# Patient Record
Sex: FEMALE | Race: OTHER | URBAN - METROPOLITAN AREA
[De-identification: names, ages, dates, MRNs, and addresses within clinical notes are randomized per-mention and may not be internally consistent; named-entity substitution may affect disease eponyms.]

---

## 2017-11-16 ENCOUNTER — TELEPHONE (OUTPATIENT)
Dept: SURGERY | Facility: CLINIC | Age: 47
End: 2017-11-16

## 2017-11-20 ENCOUNTER — TELEPHONE (OUTPATIENT)
Dept: SURGERY | Facility: CLINIC | Age: 47
End: 2017-11-20

## 2017-11-20 NOTE — TELEPHONE ENCOUNTER
Patient returning call; she reports that she is seeking a prophylactic mastectomy and abdominal flap reconstruction. She has a history of ovarian cancer for which she has had a hysterectomy and is BRCA1 positive gene carrier.      I asked the pt if she has

## 2017-12-01 ENCOUNTER — OFFICE VISIT (OUTPATIENT)
Dept: SURGERY | Facility: CLINIC | Age: 47
End: 2017-12-01

## 2017-12-01 VITALS
WEIGHT: 158.38 LBS | HEIGHT: 60 IN | BODY MASS INDEX: 31.09 KG/M2 | SYSTOLIC BLOOD PRESSURE: 121 MMHG | HEART RATE: 70 BPM | DIASTOLIC BLOOD PRESSURE: 81 MMHG

## 2017-12-01 DIAGNOSIS — Z01.818 PRE-OP TESTING: Primary | ICD-10-CM

## 2017-12-01 DIAGNOSIS — Z91.89 INCREASED RISK OF BREAST CANCER: ICD-10-CM

## 2017-12-01 PROCEDURE — 99203 OFFICE O/P NEW LOW 30 MIN: CPT | Performed by: SURGERY

## 2017-12-01 RX ORDER — TAMOXIFEN CITRATE 10 MG/1
20 TABLET ORAL DAILY
COMMUNITY
End: 2018-03-16 | Stop reason: ALTCHOICE

## 2017-12-01 NOTE — PROGRESS NOTES
Surgery and wash instructions verbally reviewed with the patient and she was provided with written instructions. An order for CTA abdomen/pelvis placed and discussed with the patient.  She was also provided with Dr. Shwetha Perez information and understands that

## 2017-12-01 NOTE — CONSULTS
New Patient Consultation    Chief Complaint: Bailey Ray 1 mutation    History of Present Illness:   Ophelia Chaparro is a 50year old female referred by her friend a former pt of mine for [address] free flap reconstruction.    The patient is positive and is planning to patient denies ear pain, ear drainage, hearing loss, change in vision, double vision, cataracts, glaucoma, nasal congestion, nosebleed, hoarseness, sore throat, or swollen glands  Respiratory:   The patient denies shortness of breath, cough, bloody cough, feeling of despair. Physical Exam:    /81 (BP Location: Right arm, Patient Position: Sitting)   Pulse 70   Ht 1.524 m (5')   Wt 71.8 kg (158 lb 6.4 oz)   BMI 30.94 kg/m²     Constitutional: The patient is an alert, oriented and well-developed. discussed the possible need for revisions, the process of nipple areolar reconstruction, and a contralateral balancing procedure (augmentation, mastopexy, reduction).      She also would like us to recommend a breast surgeon and we gave her Dr Shaheed Obrien infor

## 2017-12-13 ENCOUNTER — OFFICE VISIT (OUTPATIENT)
Dept: SURGERY | Facility: CLINIC | Age: 47
End: 2017-12-13

## 2017-12-13 DIAGNOSIS — Z15.01 BRCA1 GENE MUTATION POSITIVE: Primary | ICD-10-CM

## 2017-12-13 DIAGNOSIS — Z15.09 BRCA1 GENE MUTATION POSITIVE: Primary | ICD-10-CM

## 2017-12-13 PROCEDURE — 99245 OFF/OP CONSLTJ NEW/EST HI 55: CPT | Performed by: SURGERY

## 2017-12-13 NOTE — PROGRESS NOTES
Reviewed planned surgery portion for Dr Tati Olivo. Pre op previously done by plastics. Pt to call with any questions.

## 2017-12-14 NOTE — PROGRESS NOTES
Breast Surgery New Patient Consultation    This is the first visit for this 50year old woman, referred by Dr. Chichi Barrera, who presents for evaluation of BRCA1 mutation.     History of Present Illness:   Ms. Emily Wells is a 50year old woman who presents with a daily. Disp:  Rfl:      No current facility-administered medications on file prior to visit.        No outpatient prescriptions have been marked as taking for the 12/13/17 encounter (Office Visit) with Elizabeth Bermudez MD.    Allergies:    No Known Allerg urinary hesitancy or retaining urine, painful urination, urinary incontinence, decreased urine stream, blood in the urine or vaginal/penile discharge.     Skin:    The patient denies rash, itching, skin lesions, dry skin, change in skin color or change in m movement of the pectoralis. There is no nipple retraction. No nipple discharge can be elicited. The parenchyma is mildly nodular.  There are no dominant masses in the breast. The axillary tail is normal.  There is a well-healed lumpectomy incision in the in normal limits, we will proceed with a bilateral skin sparing mastectomy. She will need no gabriel interrogation provided her imaging remains normal with no new clinical symptoms prior to her surgical intervention.   The risks and possible complications of th

## 2017-12-18 ENCOUNTER — TELEPHONE (OUTPATIENT)
Dept: SURGERY | Facility: CLINIC | Age: 47
End: 2017-12-18

## 2017-12-18 NOTE — TELEPHONE ENCOUNTER
I contacted her medical oncologist office regarding a letter, authorizing the removal of patient's mediport at the time of her mastectomies surgery.  I received confirmation they got my request, she is forwarding it to her physician

## 2018-02-01 ENCOUNTER — HOSPITAL ENCOUNTER (OUTPATIENT)
Dept: CT IMAGING | Facility: HOSPITAL | Age: 48
Discharge: HOME | End: 2018-02-01
Attending: SURGERY
Payer: COMMERCIAL

## 2018-02-01 DIAGNOSIS — Z01.818 PRE-OP TESTING: ICD-10-CM

## 2018-02-01 LAB — CREAT BLD-MCNC: 0.6 MG/DL (ref 0.5–1.5)

## 2018-02-01 PROCEDURE — 74174 CTA ABD&PLVS W/CONTRAST: CPT | Performed by: SURGERY

## 2018-02-01 PROCEDURE — 82565 ASSAY OF CREATININE: CPT

## 2018-02-07 ENCOUNTER — TELEPHONE (OUTPATIENT)
Dept: SURGERY | Facility: CLINIC | Age: 48
End: 2018-02-07

## 2018-02-07 NOTE — TELEPHONE ENCOUNTER
I called the pt and discussed with her the CTA results (incisional hernia and long intramuscular course of perforators). She will see Dr Lonnie Souza to assist with hernia repair at time of GOPAL free flap.  Pt also wants to come in for pre-OP visit mid/end of Febr

## 2018-02-20 ENCOUNTER — OFFICE VISIT (OUTPATIENT)
Dept: SURGERY | Facility: CLINIC | Age: 48
End: 2018-02-20

## 2018-02-20 VITALS
WEIGHT: 158 LBS | BODY MASS INDEX: 31.02 KG/M2 | HEIGHT: 60 IN | TEMPERATURE: 97 F | SYSTOLIC BLOOD PRESSURE: 130 MMHG | DIASTOLIC BLOOD PRESSURE: 84 MMHG | HEART RATE: 73 BPM

## 2018-02-20 DIAGNOSIS — K43.2 INCISIONAL HERNIA OF ANTERIOR ABDOMINAL WALL WITHOUT OBSTRUCTION OR GANGRENE: ICD-10-CM

## 2018-02-20 DIAGNOSIS — K43.9 VENTRAL HERNIA WITHOUT OBSTRUCTION OR GANGRENE: Primary | ICD-10-CM

## 2018-02-20 PROCEDURE — 99243 OFF/OP CNSLTJ NEW/EST LOW 30: CPT | Performed by: SURGERY

## 2018-02-21 ENCOUNTER — TELEPHONE (OUTPATIENT)
Dept: SURGERY | Facility: CLINIC | Age: 48
End: 2018-02-21

## 2018-02-21 DIAGNOSIS — K43.9 VENTRAL HERNIA WITHOUT OBSTRUCTION OR GANGRENE: Primary | ICD-10-CM

## 2018-02-23 ENCOUNTER — OFFICE VISIT (OUTPATIENT)
Dept: SURGERY | Facility: CLINIC | Age: 48
End: 2018-02-23

## 2018-02-23 VITALS — WEIGHT: 156.81 LBS | BODY MASS INDEX: 30.79 KG/M2 | HEIGHT: 60 IN

## 2018-02-23 DIAGNOSIS — Z91.89 INCREASED RISK OF BREAST CANCER: Primary | ICD-10-CM

## 2018-02-23 PROCEDURE — 99213 OFFICE O/P EST LOW 20 MIN: CPT | Performed by: SURGERY

## 2018-02-23 NOTE — PROGRESS NOTES
Of note, the patient would like right chest port removal at the time of the joint case with Rodolfo Cole, Lokesh Baker and Reva.    The patient understands that we need documentation from Dr. Percy Alba prior to the surgery to acknowledge there is no contraind

## 2018-02-23 NOTE — CONSULTS
Estabilshed Patient Consultation    Chief Complaint: absence of both breasts    History of Present Illness:   Kisha Quintero is a 50year old female referred by her friend a former pt of mine for [address] free flap reconstruction.    The patient is positive and Exam:    There were no vitals taken for this visit.     Constitutional: The patient is an alert, oriented and well-developed.      Neurologic: Speech patterns and movements are normal.      Psychiatric: Affect is appropriate.     Eyes: Conjunctiva are clear Finally, we discussed the possible need for revisions, the process of nipple areolar reconstruction.     She does not want a nipple sparing approach but rather a skin sparing approach and this is a good choice based on her desire to be smaller and more lif

## 2018-02-26 ENCOUNTER — TELEPHONE (OUTPATIENT)
Dept: SURGERY | Facility: CLINIC | Age: 48
End: 2018-02-26

## 2018-02-26 ENCOUNTER — NURSE ONLY (OUTPATIENT)
Dept: SURGERY | Facility: CLINIC | Age: 48
End: 2018-02-26

## 2018-02-26 NOTE — TELEPHONE ENCOUNTER
I called and spoke with the patient regarding her plan for the week in preparation for surgery with Mehul Nelson and Leigh Duffy on Monday, 3/5-  She plans to get a Neupogen injection with Dr. Forrest Limon office this Wednesday and have labs redrawn th

## 2018-02-26 NOTE — PROGRESS NOTES
An order was received for removal of port-a-cath per Dr. Shane Kothari, the patient's oncologist at the [facility]-  This procedure is to be done at the time of bilateral breast reconstructions with abdominal free flap.    Order sent to be scanned in t

## 2018-02-27 ENCOUNTER — TELEPHONE (OUTPATIENT)
Dept: SURGERY | Facility: CLINIC | Age: 48
End: 2018-02-27

## 2018-03-02 ENCOUNTER — TELEPHONE (OUTPATIENT)
Dept: SURGERY | Facility: CLINIC | Age: 48
End: 2018-03-02

## 2018-03-02 ENCOUNTER — ANESTHESIA EVENT (OUTPATIENT)
Dept: SURGERY | Facility: HOSPITAL | Age: 48
DRG: 585 | End: 2018-03-02
Payer: COMMERCIAL

## 2018-03-02 NOTE — TELEPHONE ENCOUNTER
I spoke to [facility] in Dr. Rebecca Howard office who informed me that Dr. Ziyad Rush will send us a  history and physical form, clearing stating that patient is cleared for surgery. Notified patient that we are still waiting for the form to be sent to our office.

## 2018-03-05 ENCOUNTER — APPOINTMENT (OUTPATIENT)
Dept: GENERAL RADIOLOGY | Facility: HOSPITAL | Age: 48
DRG: 585 | End: 2018-03-05
Attending: SURGERY
Payer: COMMERCIAL

## 2018-03-05 ENCOUNTER — HOSPITAL ENCOUNTER (INPATIENT)
Facility: HOSPITAL | Age: 48
LOS: 4 days | Discharge: HOME | DRG: 585 | End: 2018-03-09
Attending: SURGERY | Admitting: SURGERY
Payer: COMMERCIAL

## 2018-03-05 ENCOUNTER — ANESTHESIA (OUTPATIENT)
Dept: SURGERY | Facility: HOSPITAL | Age: 48
DRG: 585 | End: 2018-03-05
Payer: COMMERCIAL

## 2018-03-05 ENCOUNTER — SURGERY (OUTPATIENT)
Age: 48
End: 2018-03-05

## 2018-03-05 DIAGNOSIS — Z91.89 INCREASED RISK OF BREAST CANCER: ICD-10-CM

## 2018-03-05 PROCEDURE — 88300 SURGICAL PATH GROSS: CPT | Performed by: SURGERY

## 2018-03-05 PROCEDURE — 74018 RADEX ABDOMEN 1 VIEW: CPT | Performed by: SURGERY

## 2018-03-05 PROCEDURE — 0HRT077 REPLACEMENT OF RIGHT BREAST USING DEEP INFERIOR EPIGASTRIC ARTERY PERFORATOR FLAP, OPEN APPROACH: ICD-10-PCS | Performed by: SURGERY

## 2018-03-05 PROCEDURE — 0HRU077 REPLACEMENT OF LEFT BREAST USING DEEP INFERIOR EPIGASTRIC ARTERY PERFORATOR FLAP, OPEN APPROACH: ICD-10-PCS | Performed by: SURGERY

## 2018-03-05 PROCEDURE — 87081 CULTURE SCREEN ONLY: CPT | Performed by: SURGERY

## 2018-03-05 PROCEDURE — 0HTV0ZZ RESECTION OF BILATERAL BREAST, OPEN APPROACH: ICD-10-PCS | Performed by: SURGERY

## 2018-03-05 PROCEDURE — 0JPT0WZ REMOVAL OF TOTALLY IMPLANTABLE VASCULAR ACCESS DEVICE FROM TRUNK SUBCUTANEOUS TISSUE AND FASCIA, OPEN APPROACH: ICD-10-PCS | Performed by: SURGERY

## 2018-03-05 PROCEDURE — 88305 TISSUE EXAM BY PATHOLOGIST: CPT | Performed by: SURGERY

## 2018-03-05 PROCEDURE — 0WUF0JZ SUPPLEMENT ABDOMINAL WALL WITH SYNTHETIC SUBSTITUTE, OPEN APPROACH: ICD-10-PCS | Performed by: SURGERY

## 2018-03-05 PROCEDURE — 88307 TISSUE EXAM BY PATHOLOGIST: CPT | Performed by: SURGERY

## 2018-03-05 DEVICE — IMPLANTABLE DEVICE: Type: IMPLANTABLE DEVICE | Site: BREAST | Status: FUNCTIONAL

## 2018-03-05 DEVICE — VENTRALIGHT ST MESH
Type: IMPLANTABLE DEVICE | Site: ABDOMEN | Status: FUNCTIONAL
Brand: VENTRALIGHT ST

## 2018-03-05 RX ORDER — SODIUM CHLORIDE, SODIUM LACTATE, POTASSIUM CHLORIDE, CALCIUM CHLORIDE 600; 310; 30; 20 MG/100ML; MG/100ML; MG/100ML; MG/100ML
INJECTION, SOLUTION INTRAVENOUS CONTINUOUS
Status: DISCONTINUED | OUTPATIENT
Start: 2018-03-05 | End: 2018-03-09

## 2018-03-05 RX ORDER — MORPHINE SULFATE 4 MG/ML
8 INJECTION, SOLUTION INTRAMUSCULAR; INTRAVENOUS
Status: DISCONTINUED | OUTPATIENT
Start: 2018-03-05 | End: 2018-03-06

## 2018-03-05 RX ORDER — HEPARIN SODIUM 5000 [USP'U]/ML
INJECTION, SOLUTION INTRAVENOUS; SUBCUTANEOUS AS NEEDED
Status: DISCONTINUED | OUTPATIENT
Start: 2018-03-05 | End: 2018-03-05 | Stop reason: HOSPADM

## 2018-03-05 RX ORDER — MORPHINE SULFATE 4 MG/ML
2 INJECTION, SOLUTION INTRAMUSCULAR; INTRAVENOUS EVERY 5 MIN PRN
Status: ACTIVE | OUTPATIENT
Start: 2018-03-05 | End: 2018-03-05

## 2018-03-05 RX ORDER — METOCLOPRAMIDE HYDROCHLORIDE 5 MG/ML
10 INJECTION INTRAMUSCULAR; INTRAVENOUS AS NEEDED
Status: DISCONTINUED | OUTPATIENT
Start: 2018-03-05 | End: 2018-03-06

## 2018-03-05 RX ORDER — CEFAZOLIN SODIUM/WATER 2 G/20 ML
2 SYRINGE (ML) INTRAVENOUS ONCE
Status: DISCONTINUED | OUTPATIENT
Start: 2018-03-05 | End: 2018-03-05 | Stop reason: HOSPADM

## 2018-03-05 RX ORDER — DIPHENHYDRAMINE HYDROCHLORIDE 50 MG/ML
25 INJECTION INTRAMUSCULAR; INTRAVENOUS EVERY 4 HOURS PRN
Status: DISCONTINUED | OUTPATIENT
Start: 2018-03-05 | End: 2018-03-09

## 2018-03-05 RX ORDER — MORPHINE SULFATE 4 MG/ML
2 INJECTION, SOLUTION INTRAMUSCULAR; INTRAVENOUS
Status: DISCONTINUED | OUTPATIENT
Start: 2018-03-05 | End: 2018-03-09

## 2018-03-05 RX ORDER — ENOXAPARIN SODIUM 100 MG/ML
40 INJECTION SUBCUTANEOUS DAILY
Status: DISCONTINUED | OUTPATIENT
Start: 2018-03-06 | End: 2018-03-09

## 2018-03-05 RX ORDER — CEFAZOLIN SODIUM/WATER 2 G/20 ML
2 SYRINGE (ML) INTRAVENOUS EVERY 8 HOURS
Status: COMPLETED | OUTPATIENT
Start: 2018-03-06 | End: 2018-03-06

## 2018-03-05 RX ORDER — ACETAMINOPHEN 325 MG/1
650 TABLET ORAL 4 TIMES DAILY
Status: DISCONTINUED | OUTPATIENT
Start: 2018-03-05 | End: 2018-03-07

## 2018-03-05 RX ORDER — ASPIRIN 300 MG
SUPPOSITORY, RECTAL RECTAL AS NEEDED
Status: DISCONTINUED | OUTPATIENT
Start: 2018-03-05 | End: 2018-03-05 | Stop reason: HOSPADM

## 2018-03-05 RX ORDER — MEPERIDINE HYDROCHLORIDE 25 MG/ML
12.5 INJECTION INTRAMUSCULAR; INTRAVENOUS; SUBCUTANEOUS AS NEEDED
Status: DISCONTINUED | OUTPATIENT
Start: 2018-03-05 | End: 2018-03-06

## 2018-03-05 RX ORDER — SODIUM CHLORIDE, SODIUM LACTATE, POTASSIUM CHLORIDE, CALCIUM CHLORIDE 600; 310; 30; 20 MG/100ML; MG/100ML; MG/100ML; MG/100ML
INJECTION, SOLUTION INTRAVENOUS CONTINUOUS
Status: DISCONTINUED | OUTPATIENT
Start: 2018-03-05 | End: 2018-03-06

## 2018-03-05 RX ORDER — NALOXONE HYDROCHLORIDE 0.4 MG/ML
80 INJECTION, SOLUTION INTRAMUSCULAR; INTRAVENOUS; SUBCUTANEOUS AS NEEDED
Status: DISCONTINUED | OUTPATIENT
Start: 2018-03-05 | End: 2018-03-06

## 2018-03-05 RX ORDER — DOCUSATE SODIUM 100 MG/1
100 CAPSULE, LIQUID FILLED ORAL 2 TIMES DAILY
Status: DISCONTINUED | OUTPATIENT
Start: 2018-03-06 | End: 2018-03-09

## 2018-03-05 RX ORDER — ONDANSETRON 2 MG/ML
8 INJECTION INTRAMUSCULAR; INTRAVENOUS EVERY 6 HOURS PRN
Status: DISCONTINUED | OUTPATIENT
Start: 2018-03-05 | End: 2018-03-09

## 2018-03-05 RX ORDER — MORPHINE SULFATE 4 MG/ML
4 INJECTION, SOLUTION INTRAMUSCULAR; INTRAVENOUS
Status: DISCONTINUED | OUTPATIENT
Start: 2018-03-05 | End: 2018-03-06

## 2018-03-05 RX ORDER — HEPARIN SODIUM 5000 [USP'U]/ML
INJECTION, SOLUTION INTRAVENOUS; SUBCUTANEOUS
Status: COMPLETED
Start: 2018-03-05 | End: 2018-03-05

## 2018-03-05 RX ORDER — METOCLOPRAMIDE HYDROCHLORIDE 5 MG/ML
10 INJECTION INTRAMUSCULAR; INTRAVENOUS EVERY 6 HOURS PRN
Status: DISCONTINUED | OUTPATIENT
Start: 2018-03-05 | End: 2018-03-09

## 2018-03-05 RX ORDER — ONDANSETRON 2 MG/ML
4 INJECTION INTRAMUSCULAR; INTRAVENOUS AS NEEDED
Status: DISCONTINUED | OUTPATIENT
Start: 2018-03-05 | End: 2018-03-06

## 2018-03-05 RX ORDER — OXYCODONE HYDROCHLORIDE 10 MG/1
10 TABLET ORAL EVERY 4 HOURS PRN
Status: DISCONTINUED | OUTPATIENT
Start: 2018-03-05 | End: 2018-03-07

## 2018-03-05 RX ORDER — HEPARIN SODIUM 5000 [USP'U]/ML
5000 INJECTION, SOLUTION INTRAVENOUS; SUBCUTANEOUS ONCE
Status: DISCONTINUED | OUTPATIENT
Start: 2018-03-05 | End: 2018-03-05

## 2018-03-05 RX ORDER — OXYCODONE HYDROCHLORIDE 5 MG/1
5 TABLET ORAL EVERY 4 HOURS PRN
Status: DISCONTINUED | OUTPATIENT
Start: 2018-03-05 | End: 2018-03-07

## 2018-03-05 RX ORDER — TIZANIDINE 4 MG/1
4 TABLET ORAL 3 TIMES DAILY PRN
Status: DISCONTINUED | OUTPATIENT
Start: 2018-03-05 | End: 2018-03-09

## 2018-03-05 RX ORDER — CEFAZOLIN SODIUM 1 G/3ML
INJECTION, POWDER, FOR SOLUTION INTRAMUSCULAR; INTRAVENOUS
Status: DISCONTINUED | OUTPATIENT
Start: 2018-03-05 | End: 2018-03-05 | Stop reason: HOSPADM

## 2018-03-05 RX ORDER — VERAPAMIL HYDROCHLORIDE 2.5 MG/ML
INJECTION, SOLUTION INTRAVENOUS AS NEEDED
Status: DISCONTINUED | OUTPATIENT
Start: 2018-03-05 | End: 2018-03-05 | Stop reason: HOSPADM

## 2018-03-05 RX ORDER — ASPIRIN 81 MG/1
81 TABLET ORAL DAILY
Status: DISCONTINUED | OUTPATIENT
Start: 2018-03-06 | End: 2018-03-09

## 2018-03-05 NOTE — ANESTHESIA PREPROCEDURE EVALUATION
PRE-OP EVALUATION    Patient Name: Karyle Marker    Pre-op Diagnosis: Increased risk of breast cancer [Z91.89]    Procedure(s):  Bilateral skin sparing mastectomies, Removal of Right mediport. (DR. LIRA)  Immediate bilateral breast reconstruction with abdom Neuro/Psych    Negative neuro/psych ROS.                            Increased risk of breast cancer (Z91.89)  Ovarian cancer ([facility] [address]  Personal history of antineoplastic chemotherapy          Past Surgical History:  No date: ADENOIDECTOMY  1995, 1999: ANESTH,C

## 2018-03-05 NOTE — H&P
History of Present Illness:   Ms. Shaq Miller is a 50year old woman who presents with a personal history of ovarian cancer and confirmed BRCA1 mutation.   The patient reports that she underwent treatment for her ovarian cancer in 2016 and has done well sin for the 12/13/17 encounter (Office Visit) with Yun Montana MD.     Allergies:    No Known Allergies     Family History:   No family history on file.     She is not of Ashkenazi [place] ancestry.     Social History:     Alcohol use Yes 0.6 oz/week 1 G vaginal/penile discharge.     Skin:    The patient denies rash, itching, skin lesions, dry skin, change in skin color or change in moles.      Hematologic/Lymphatic:  The patient denies easily bruising or bleeding or persistent swollen glands or lymph node nodular. There are no dominant masses in the breast. The axillary tail is normal.  There is a well-healed lumpectomy incision in the inferior breast with no underlying palpable masses.   Left breast:   The skin, nipple, and areola appear normal. There is no provided her imaging remains normal with no new clinical symptoms prior to her surgical intervention. The risks and possible complications of the procedure were discussed with the patient at length and she agrees to proceed.   I will contact her once I hav

## 2018-03-05 NOTE — BRIEF OP NOTE
Pre-Operative Diagnosis: Increased risk of breast cancer [Z91.89]     Post-Operative Diagnosis: Increased risk of breast cancer [Z91.89]     Procedure Performed:   Procedure(s):  Bilateral skin sparing mastectomies, Removal of Right mediport. (DR. Cy Cruz

## 2018-03-05 NOTE — H&P (VIEW-ONLY)
Estabilshed Patient Consultation    Chief Complaint: absence of both breasts    History of Present Illness:   Christopher Stubbs is a 50year old female referred by her friend a former pt of mine for [address] free flap reconstruction.    The patient is positive and Exam:    There were no vitals taken for this visit.     Constitutional: The patient is an alert, oriented and well-developed.      Neurologic: Speech patterns and movements are normal.      Psychiatric: Affect is appropriate.     Eyes: Conjunctiva are clear Finally, we discussed the possible need for revisions, the process of nipple areolar reconstruction.     She does not want a nipple sparing approach but rather a skin sparing approach and this is a good choice based on her desire to be smaller and more lif

## 2018-03-06 ENCOUNTER — APPOINTMENT (OUTPATIENT)
Dept: GENERAL RADIOLOGY | Facility: HOSPITAL | Age: 48
DRG: 585 | End: 2018-03-06
Attending: SURGERY
Payer: COMMERCIAL

## 2018-03-06 LAB
ALBUMIN SERPL-MCNC: 2.2 G/DL (ref 3.5–4.8)
ALP LIVER SERPL-CCNC: 68 U/L (ref 39–100)
ALT SERPL-CCNC: 14 U/L (ref 14–54)
AST SERPL-CCNC: 19 U/L (ref 15–41)
BAND %: 11 %
BASOPHIL % MANUAL: 0 %
BASOPHIL ABSOLUTE MANUAL: 0 X10(3) UL (ref 0–0.1)
BILIRUB SERPL-MCNC: 0.3 MG/DL (ref 0.1–2)
BILIRUB UR QL STRIP.AUTO: NEGATIVE
BUN BLD-MCNC: 12 MG/DL (ref 8–20)
CALCIUM BLD-MCNC: 7.8 MG/DL (ref 8.3–10.3)
CHLORIDE: 110 MMOL/L (ref 101–111)
CLARITY UR REFRACT.AUTO: CLEAR
CO2: 25 MMOL/L (ref 22–32)
COLOR UR AUTO: YELLOW
CREAT BLD-MCNC: 0.94 MG/DL (ref 0.55–1.02)
EOSINOPHIL % MANUAL: 0 %
EOSINOPHIL ABSOLUTE MANUAL: 0 X10(3) UL (ref 0–0.3)
ERYTHROCYTE [DISTWIDTH] IN BLOOD BY AUTOMATED COUNT: 12.6 % (ref 11.5–16)
GLUCOSE BLD-MCNC: 135 MG/DL (ref 70–99)
GLUCOSE UR STRIP.AUTO-MCNC: NEGATIVE MG/DL
HAV IGM SER QL: 1.3 MG/DL (ref 1.7–3)
HCT VFR BLD AUTO: 26.2 % (ref 34–50)
HGB BLD-MCNC: 8.7 G/DL (ref 12–16)
KETONES UR STRIP.AUTO-MCNC: NEGATIVE MG/DL
LEUKOCYTE ESTERASE UR QL STRIP.AUTO: NEGATIVE
LYMPHOCYTE % MANUAL: 20 %
LYMPHOCYTE ABSOLUTE MANUAL: 1.26 X10(3) UL (ref 0.9–4)
M PROTEIN MFR SERPL ELPH: 4.6 G/DL (ref 6.1–8.3)
MCH RBC QN AUTO: 30.5 PG (ref 27–33.2)
MCHC RBC AUTO-ENTMCNC: 33.2 G/DL (ref 31–37)
MCV RBC AUTO: 91.9 FL (ref 81–100)
MONOCYTE % MANUAL: 8 %
MONOCYTE ABSOLUTE MANUAL: 0.5 X10(3) UL (ref 0.1–1)
MORPHOLOGY: NORMAL
NEUTROPHIL ABS PRELIM: 4.58 X10 (3) UL (ref 1.3–6.7)
NEUTROPHIL ABSOLUTE MANUAL: 4.54 X10(3) UL (ref 1.3–6.7)
NEUTROPHILS % MANUAL: 61 %
NITRITE UR QL STRIP.AUTO: NEGATIVE
PH UR STRIP.AUTO: 5 [PH] (ref 4.5–8)
PLATELET # BLD AUTO: 147 10(3)UL (ref 150–450)
PLATELET MORPHOLOGY: NORMAL
POTASSIUM SERPL-SCNC: 4.5 MMOL/L (ref 3.6–5.1)
PROT UR STRIP.AUTO-MCNC: NEGATIVE MG/DL
RBC # BLD AUTO: 2.85 X10(6)UL (ref 3.8–5.1)
RBC UR QL AUTO: NEGATIVE
RED CELL DISTRIBUTION WIDTH-SD: 41.9 FL (ref 35.1–46.3)
SODIUM SERPL-SCNC: 142 MMOL/L (ref 136–144)
SP GR UR STRIP.AUTO: 1.02 (ref 1–1.03)
TOTAL CELLS COUNTED: 100
UROBILINOGEN UR STRIP.AUTO-MCNC: <2 MG/DL
VACUOLATED NEUTS: PRESENT
WBC # BLD AUTO: 6.3 X10(3) UL (ref 4–13)

## 2018-03-06 PROCEDURE — 81003 URINALYSIS AUTO W/O SCOPE: CPT | Performed by: INTERNAL MEDICINE

## 2018-03-06 PROCEDURE — C9113 INJ PANTOPRAZOLE SODIUM, VIA: HCPCS | Performed by: PHYSICIAN ASSISTANT

## 2018-03-06 PROCEDURE — 85007 BL SMEAR W/DIFF WBC COUNT: CPT | Performed by: NURSE PRACTITIONER

## 2018-03-06 PROCEDURE — 80053 COMPREHEN METABOLIC PANEL: CPT | Performed by: PHYSICIAN ASSISTANT

## 2018-03-06 PROCEDURE — 71045 X-RAY EXAM CHEST 1 VIEW: CPT | Performed by: NURSE PRACTITIONER

## 2018-03-06 PROCEDURE — 83735 ASSAY OF MAGNESIUM: CPT | Performed by: NURSE PRACTITIONER

## 2018-03-06 PROCEDURE — 85025 COMPLETE CBC W/AUTO DIFF WBC: CPT | Performed by: NURSE PRACTITIONER

## 2018-03-06 PROCEDURE — 85027 COMPLETE CBC AUTOMATED: CPT | Performed by: NURSE PRACTITIONER

## 2018-03-06 RX ORDER — HYDROMORPHONE HYDROCHLORIDE 1 MG/ML
0.4 INJECTION, SOLUTION INTRAMUSCULAR; INTRAVENOUS; SUBCUTANEOUS EVERY 2 HOUR PRN
Status: ACTIVE | OUTPATIENT
Start: 2018-03-06 | End: 2018-03-08

## 2018-03-06 RX ORDER — HYDROMORPHONE HYDROCHLORIDE 1 MG/ML
1.2 INJECTION, SOLUTION INTRAMUSCULAR; INTRAVENOUS; SUBCUTANEOUS EVERY 2 HOUR PRN
Status: DISPENSED | OUTPATIENT
Start: 2018-03-06 | End: 2018-03-08

## 2018-03-06 RX ORDER — HYDROMORPHONE HYDROCHLORIDE 1 MG/ML
0.8 INJECTION, SOLUTION INTRAMUSCULAR; INTRAVENOUS; SUBCUTANEOUS EVERY 2 HOUR PRN
Status: DISPENSED | OUTPATIENT
Start: 2018-03-06 | End: 2018-03-08

## 2018-03-06 NOTE — OPERATIVE REPORT
[facility]    PATIENT'S NAME: SERGIO GRANADOS   ATTENDING PHYSICIAN: Cheryl Zhang MD   OPERATING PHYSICIAN: Nelson Gonzalez M.D.    PATIENT ACCOUNT#:   [de-identified]    LOCATION:  [address] [place] [address]  MEDICAL RECORD #:   [id number]       DATE OF BIRTH:  [birth year] the patient's previous scar. The patient was then taken to the operating room, properly identified, placed in supine position. Sequential compression devices were placed on bilateral lower extremities.   Intravenous antibiotic prophylaxis was administered quite diminutive. The medial row perforators were exposed. There were 3 medial row perforators which appeared of usual size which appeared to be in line. These were chose to carry the flap.   The fascia surrounding the perforators was incised with roscoe have normal perfusion. The flap was then placed in the chest wall pocket, taking care that there was no torsion or tension on the pedicle. The flap was then temporarily stapled in place.   At this point, Dr. Fracnisco Gonzaelz entered the room and ventral hernia repai required for the extent of wound closure at the completion of the procedure. Dictated By Lina Pacheco M.D.  d: 03/05/2018 20:02:12  t: 03/05/2018 23:17:26  [facility] [id number]  LAP/

## 2018-03-06 NOTE — PROGRESS NOTES
[facility]  Progress Note    Brant Jenkins Patient Status:  Inpatient    1970 MRN [id number]   [facility] 4SW-A Attending Arla Simmonds., MD   Hosp Day # 1 PCP Kayce Lujan     Subjective:  No new complaints, incisional pain.  Woul 5. Ambulation encouraged when okay with plastics  6. DVT prophylaxis    My total face time with this patient was 21 minutes. Greater than half of our visit was spent in counseling the patient on the above listed diagnoses and treatment options.     Anastasia West

## 2018-03-06 NOTE — PROGRESS NOTES
ICU  Critical Care APRN Progress Note    NAME: Mihai Helton - ROOM: [address] [place], [place] [address]A - MRN: [id number] - Age: 50year old - :1970    History Of Present Illness:  Mihai Helton is a 50year old female with PMHx significant for ovarian cancer--s/p chemotherapy, Temp 98.7 °F (37.1 °C) (Temporal)   Resp 16   Ht 152.4 cm (5')   Wt 154 lb 15.7 oz (70.3 kg)   SpO2 100%   BMI 30.27 kg/m²   Physical Exam:    General Appearance: Alert, cooperative, no distress, appears stated age  Neck: No JVD, neck supple, no adenopathy skin sparing mastectomy (Dr Ce Garcia) with immediate bilateral breast reconstruction with GOPAL free flap (Dr. Avinash Villalobos and Dr Prema Harman)  4. Removal of right mediport (Dr Ce Garcia)  5.   Symptomatic ventral incisional hernias x2--s/p Laparotomy, lysis of adhesions,

## 2018-03-06 NOTE — BRIEF OP NOTE
Pre-Operative Diagnosis: Increased risk of breast cancer [Z91.89]     Post-Operative Diagnosis: Increased risk of breast cancer [Z91.89]     Procedure Performed:   Procedure(s):  Bilateral skin sparing mastectomies, Removal of Right mediport. (DR. Kristen Crystal

## 2018-03-06 NOTE — OPERATIVE REPORT
[facility]  Operative Note    Kisha Quintero Location: OR   [facility] [id number] MRN [id number]   Admission Date 3/5/2018 Operation Date 3/5/2018   Attending Physician Joan Garcia MD Operating Physician Conor Hernandes MD     Date of procedure:   5, including but not limited to bleeding, infection, seroma/ hematoma formation, postoperative wound complications, possible need for mesh pending intraoperative findings, infection of the mesh necessitating its removal, recurrence of the hernia despite repai Angelika Perkins    EBL:   Minimal    Drain:  AURORA drain    Pathologic Specimen:    None    Simon Alvarado MD

## 2018-03-06 NOTE — PLAN OF CARE
CARDIOVASCULAR - ADULT    • Absence of cardiac arrhythmias or at baseline Not Progressing        PAIN - ADULT    • Verbalizes/displays adequate comfort level or patient's stated pain goal Not Progressing          CARDIOVASCULAR - ADULT    • Maintains optim

## 2018-03-06 NOTE — PAYOR COMM NOTE
--------------  ADMISSION REVIEW     Payor: [address]  Subscriber #:  [id number]  Authorization Number: [id number]     Admit date: 3/5/18  Admit time: 2145       Admitting Physician: Javon Hahn MD  Attending Physician:  Javon Hahn MD history of 23 months, last unknown time ago. She achieved menarche at age 13 and LMP     Age of Menopause: Total hysterectomy in 2016  She denies any history of hormone replacement therapy. She denies any history of oral contraceptive use.   She denies in SOB/Coughing at night, swelling of the legs or chest pain while walking.     Breasts:  See history of present illness     Gastrointestinal:     There is no history of difficulty or pain with swallowing, reflux symptoms, vomiting, dark or bloody stools, con supple. The thyroid is not enlarged and is without palpable masses/nodules. There are no palpable masses. The trachea is in the midline. Conjunctiva are clear, non-icteric.     Chest: The chest expands symmetrically. The lungs are clear to auscultation.     mutation, we discussed the high risk for breast cancer in need for high-risk intervention. She is interested in proceeding with a bilateral prophylactic mastectomy. She is scheduled to undergo a MRI for purposes of high risk surveillance in January 2018. LAST 1 DAY:  acetaminophen (TYLENOL) tab 650 mg     Date Action Dose Route User    3/6/2018 0830 Given 650 mg Oral Jana Leghorn      aspirin EC tab 81 mg     Date Action Dose Route User    3/6/2018 0830 Given 81 mg Oral Jana Leghorn      CeFAZolin morphINE sulfate (PF) 4 MG/ML injection 8 mg     Date Action Dose Route User    3/6/2018 0547 Given 8 mg Intravenous Sarah Chavez RN    3/6/2018 0454 Given 8 mg Intravenous Sarah Chavez RN    3/6/2018 0258 Given 8 mg Intravenous [place], Surgery.     COMPLICATIONS:  No immediate complications.

## 2018-03-06 NOTE — PLAN OF CARE
PAIN - ADULT    • Verbalizes/displays adequate comfort level or patient's stated pain goal Progressing        SKIN/TISSUE INTEGRITY - ADULT    • Incision(s), wounds(s) or drain site(s) healing without S/S of infection Progressing          Assumed care of p

## 2018-03-06 NOTE — BRIEF OP NOTE
Pre-Operative Diagnosis: Increased risk of breast cancer [Z91.89]     Post-Operative Diagnosis: Increased risk of breast cancer [Z91.89]     Procedure Performed:   Procedure(s):  Bilateral skin sparing mastectomies, Removal of Right mediport. (DR. Catrina Read

## 2018-03-06 NOTE — CONSULTS
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 She has never used smokeless tobacco. She reports that she drinks about 0.6 oz of alcohol per week . She reports that she does not use drugs.     Medications:    Prescriptions Prior to Admission:  Tamoxifen Citrate 10 MG Oral Tab Take CREATSERUM  0.94   GFRAA  82   GFRNAA  71   CA  7.8*   NA  142   K  4.5   CL  110   CO2  25.0     No results for input(s): ABGPHT, HRGEUU4X, MOOHX5Q, ABGHCO3, ABGBE, TEMP, BRUNILDA, SITE, DEV, THGB in the last 72 hours.     Invalid input(s): XCZ16KCD, CHOB

## 2018-03-06 NOTE — ANESTHESIA POSTPROCEDURE EVALUATION
[address] Patient Status:  Inpatient   Age/Gender 50year old female MRN [id number]   [facility] 4SW-A Attending Alec Bucio MD   Hosp Day # 0 PCP Dawit Alfonso       Anesthesia Post-op Note    Procedure(s):  Laura Gaffney

## 2018-03-06 NOTE — PROGRESS NOTES
PLASTICS    This is a 50year old female that is POD #1 S/P her bilateral mastectomy Gigi Paige) with bilateral GOPAL free flap breast reconstruction Xenia Rsoa) and ventral hernia repair with mesh (Dr. Markell Martinez).  She has been NPO overnight with sips of clears with m

## 2018-03-06 NOTE — PROGRESS NOTES
PRS  POD 1 s/p BL  Mastectomy with GOPAL free flap reconstruction and ventral hernia repair  Doing well, c/o dizziness  AVSS  PE: flaps warm well perfused doppler signals + viotix 69% L 52 % R  abdominal incision CDI umbilicus viable  A/P  Diet per Dr Katie Norris

## 2018-03-06 NOTE — OPERATIVE REPORT
[facility]     PATIENT'S NAME: Daniela Lopes   ATTENDING PHYSICIAN: Anjum Lara MD   OPERATING PHYSICIAN: Anjum Lara   PATIENT ACCOUNT#:   [de-identified]    LOCATION:    MEDICAL RECORD #:   [id number]       YOB: 1970  ADMISSION DATE: placed on bilateral lower extremities. Intravenous antibiotic prophylaxis was administered. The patient then underwent successful induction of general anesthesia and endotracheal intubation.   The arms were placed abducted on foam-padded armboards and loo perforators were then exposed with a lateral-to-medial elevation of the flap. The fascia surrounding the perforators was incised with tenotomy scissors. The dissection began to free the perforators from the enveloping musculature.    The dissection then p sutured to the skin with 3-0 nylon suture. The breast flaps were inset with 3-0 vicryl and 4-0 monocryl sutures.  The bed was then flexed, and the abdomen was closed in a layered fashion with 0 Vicryl suture on Yumiko fascia, interrupted 3-0 Vicryl deep de

## 2018-03-06 NOTE — OPERATIVE REPORT
[facility]    PATIENT'S NAME: SERGIO GRANADOS   ATTENDING PHYSICIAN: Cheryl Conklin MD   OPERATING PHYSICIAN: Barabara Severin. Jack Hall M.D.    PATIENT ACCOUNT#:   [de-identified]    LOCATION:  OR  OR [place] RO[address]S [address] #:   [id number]       DATE assistance was needed in order to accomplish the surgery through a difficult, more challenging incision.   The risks and possible complications of the procedure were explained to the patient including, but not limited to, infection, bleeding, injury to surr and electrocautery, the mastectomy flaps in the right breast were then raised to the borders of the clavicle, sternum, inframammary fold, and latissimus tendon laterally.   The right breast was then dissected off the underlying muscle with electrocautery in

## 2018-03-07 LAB
ANTIBODY SCREEN: NEGATIVE
BAND %: 17 %
BASOPHIL % MANUAL: 0 %
BASOPHIL ABSOLUTE MANUAL: 0 X10(3) UL (ref 0–0.1)
BUN BLD-MCNC: 9 MG/DL (ref 8–20)
CALCIUM BLD-MCNC: 7.9 MG/DL (ref 8.3–10.3)
CHLORIDE: 99 MMOL/L (ref 101–111)
CO2: 30 MMOL/L (ref 22–32)
CREAT BLD-MCNC: 0.57 MG/DL (ref 0.55–1.02)
EOSINOPHIL % MANUAL: 0 %
EOSINOPHIL ABSOLUTE MANUAL: 0 X10(3) UL (ref 0–0.3)
ERYTHROCYTE [DISTWIDTH] IN BLOOD BY AUTOMATED COUNT: 12.7 % (ref 11.5–16)
ERYTHROCYTE [DISTWIDTH] IN BLOOD BY AUTOMATED COUNT: 12.8 % (ref 11.5–16)
GLUCOSE BLD-MCNC: 123 MG/DL (ref 70–99)
HAV IGM SER QL: 2.1 MG/DL (ref 1.7–3)
HCT VFR BLD AUTO: 17.8 % (ref 34–50)
HCT VFR BLD AUTO: 20.2 % (ref 34–50)
HGB BLD-MCNC: 5.8 G/DL (ref 12–16)
HGB BLD-MCNC: 6.7 G/DL (ref 12–16)
LYMPHOCYTE % MANUAL: 21 %
LYMPHOCYTE ABSOLUTE MANUAL: 1.34 X10(3) UL (ref 0.9–4)
MCH RBC QN AUTO: 30.1 PG (ref 27–33.2)
MCH RBC QN AUTO: 30.7 PG (ref 27–33.2)
MCHC RBC AUTO-ENTMCNC: 32.6 G/DL (ref 31–37)
MCHC RBC AUTO-ENTMCNC: 33.2 G/DL (ref 31–37)
MCV RBC AUTO: 92.2 FL (ref 81–100)
MCV RBC AUTO: 92.7 FL (ref 81–100)
MONOCYTE % MANUAL: 9 %
MONOCYTE ABSOLUTE MANUAL: 0.58 X10(3) UL (ref 0.1–1)
MORPHOLOGY: NORMAL
MYELOCYTE %: 1 %
MYELOCYTE ABSOLUTE MANUAL: 0.06 X10(3) UL (ref ?–0.01)
NEUTROPHIL ABS PRELIM: 4.25 X10 (3) UL (ref 1.3–6.7)
NEUTROPHIL ABSOLUTE MANUAL: 4.42 X10(3) UL (ref 1.3–6.7)
NEUTROPHILS % MANUAL: 52 %
PHOSPHATE SERPL-MCNC: 2 MG/DL (ref 2.5–4.9)
PLATELET # BLD AUTO: 116 10(3)UL (ref 150–450)
PLATELET # BLD AUTO: 97 10(3)UL (ref 150–450)
PLATELET MORPHOLOGY: NORMAL
POTASSIUM SERPL-SCNC: 4.3 MMOL/L (ref 3.6–5.1)
RBC # BLD AUTO: 1.93 X10(6)UL (ref 3.8–5.1)
RBC # BLD AUTO: 2.18 X10(6)UL (ref 3.8–5.1)
RED CELL DISTRIBUTION WIDTH-SD: 42.5 FL (ref 35.1–46.3)
RED CELL DISTRIBUTION WIDTH-SD: 43.5 FL (ref 35.1–46.3)
RH BLOOD TYPE: POSITIVE
SODIUM SERPL-SCNC: 134 MMOL/L (ref 136–144)
TOTAL CELLS COUNTED: 100
WBC # BLD AUTO: 5.2 X10(3) UL (ref 4–13)
WBC # BLD AUTO: 6.4 X10(3) UL (ref 4–13)

## 2018-03-07 PROCEDURE — 85007 BL SMEAR W/DIFF WBC COUNT: CPT | Performed by: INTERNAL MEDICINE

## 2018-03-07 PROCEDURE — 86900 BLOOD TYPING SEROLOGIC ABO: CPT | Performed by: NURSE PRACTITIONER

## 2018-03-07 PROCEDURE — 84100 ASSAY OF PHOSPHORUS: CPT | Performed by: INTERNAL MEDICINE

## 2018-03-07 PROCEDURE — 83735 ASSAY OF MAGNESIUM: CPT | Performed by: INTERNAL MEDICINE

## 2018-03-07 PROCEDURE — C9113 INJ PANTOPRAZOLE SODIUM, VIA: HCPCS | Performed by: PHYSICIAN ASSISTANT

## 2018-03-07 PROCEDURE — 85027 COMPLETE CBC AUTOMATED: CPT | Performed by: INTERNAL MEDICINE

## 2018-03-07 PROCEDURE — 86920 COMPATIBILITY TEST SPIN: CPT

## 2018-03-07 PROCEDURE — 86850 RBC ANTIBODY SCREEN: CPT | Performed by: NURSE PRACTITIONER

## 2018-03-07 PROCEDURE — 85025 COMPLETE CBC W/AUTO DIFF WBC: CPT | Performed by: INTERNAL MEDICINE

## 2018-03-07 PROCEDURE — 80048 BASIC METABOLIC PNL TOTAL CA: CPT | Performed by: INTERNAL MEDICINE

## 2018-03-07 PROCEDURE — 30233N1 TRANSFUSION OF NONAUTOLOGOUS RED BLOOD CELLS INTO PERIPHERAL VEIN, PERCUTANEOUS APPROACH: ICD-10-PCS | Performed by: SURGERY

## 2018-03-07 PROCEDURE — 86901 BLOOD TYPING SEROLOGIC RH(D): CPT | Performed by: NURSE PRACTITIONER

## 2018-03-07 PROCEDURE — 36430 TRANSFUSION BLD/BLD COMPNT: CPT

## 2018-03-07 RX ORDER — HYDROCODONE BITARTRATE AND ACETAMINOPHEN 5; 325 MG/1; MG/1
1 TABLET ORAL EVERY 4 HOURS PRN
Status: DISCONTINUED | OUTPATIENT
Start: 2018-03-07 | End: 2018-03-09

## 2018-03-07 RX ORDER — SODIUM CHLORIDE 9 MG/ML
INJECTION, SOLUTION INTRAVENOUS ONCE
Status: DISCONTINUED | OUTPATIENT
Start: 2018-03-07 | End: 2018-03-09

## 2018-03-07 RX ORDER — HYDROCODONE BITARTRATE AND ACETAMINOPHEN 5; 325 MG/1; MG/1
2 TABLET ORAL EVERY 4 HOURS PRN
Status: DISCONTINUED | OUTPATIENT
Start: 2018-03-07 | End: 2018-03-09

## 2018-03-07 RX ORDER — HYDROCODONE BITARTRATE AND ACETAMINOPHEN 5; 325 MG/1; MG/1
1-2 TABLET ORAL EVERY 4 HOURS PRN
Status: DISCONTINUED | OUTPATIENT
Start: 2018-03-07 | End: 2018-03-07 | Stop reason: SDUPTHER

## 2018-03-07 RX ORDER — PANTOPRAZOLE SODIUM 40 MG/1
40 TABLET, DELAYED RELEASE ORAL
Status: DISCONTINUED | OUTPATIENT
Start: 2018-03-08 | End: 2018-03-09

## 2018-03-07 RX ORDER — SODIUM CHLORIDE 9 MG/ML
INJECTION, SOLUTION INTRAVENOUS ONCE
Status: COMPLETED | OUTPATIENT
Start: 2018-03-07 | End: 2018-03-07

## 2018-03-07 NOTE — PLAN OF CARE
METABOLIC/FLUID AND ELECTROLYTES - ADULT    • Hemodynamic stability and optimal renal function maintained Adequate for Discharge          CARDIOVASCULAR - ADULT    • Maintains optimal cardiac output and hemodynamic stability Progressing    • Absence of car

## 2018-03-07 NOTE — PROGRESS NOTES
[facility]  Progress Note    Keith Crowe Patient Status:  Inpatient    1970 MRN [id number]   [facility] 4SW-A Attending Eda Carlson MD   [facility] Day # 2 PCP Malka Garcia     Subjective:  No new complaints, very sore.  Pt tolerat flap breast reconstruction Virginia Burk) and ventral hernia repair with mesh (Dr. Cody Palacios)    Plan:  1. Continue full liquid diet - would not advance until evidence of bowel function  2. Antiemetics as needed  3. Pain control   4.  Ambulation or out of bed to chair

## 2018-03-07 NOTE — PROGRESS NOTES
[facility]  Progress Note    Brenton Wild Patient Status:  Inpatient    1970 MRN [id number]   [facility] 4SW-A Attending Amber Fishman MD   [facility] Day # 2 PCP Palmer Dumont     Subjective:  Brenton Wild is a(n) 50year old female. PLT  147.0*  116.0*     Recent Labs   Lab  03/06/18   0328  03/07/18   0434   GLU  135*  123*   BUN  12  9   CREATSERUM  0.94  0.57   GFRAA  82  126   GFRNAA  71  109   CA  7.8*  7.9*   NA  142  134*   K  4.5  4.3   CL  110  99*   CO2  25.0  30.0     No

## 2018-03-07 NOTE — PLAN OF CARE
Pt aox4, slightly drowsy related to pain medication. Temp 100.0, sinus tach on tele, hemodynamically stable, sats > 92% on 2 L nasal cannula. Encouraging IS and deep breathing. Rates pain 5-7/10, prn medications with relief (see mar for details).  Per tinot

## 2018-03-07 NOTE — PAYOR COMM NOTE
--------------  CONTINUED STAY REVIEW    Payor: [address]  Subscriber #:  [id number]  Authorization Number: [id number]     Admit date: 3/5/18  Admit time: 2145    Admitting Physician: Sherri Castaneda MD  Attending Physician:  SUKHDEEP Loera Problem List:     Increased risk of breast cancer     POD2 bilateral mastectomy Chad Harris) with bilateral GOPAL free flap breast reconstruction Virginia Burk) and ventral hernia repair with mesh (Dr. Cody Palacios)     Plan:  1.  Continue full liquid diet - would not advance Action Dose Route User    3/7/2018 0948 Given 81 mg Oral Maurizio Bustamante, RN      ceFAZolin sodium (ANCEF/KEFZOL) 2 GM/20ML premix IV syringe 2 g     Date Action Dose Route User    3/6/2018 2038 New Bag 2 g Intravenous Bony South RN    3/6/2018 4110 Joleen Jay, RN    3/6/2018 2000 Rate/Dose Verify (none) Intravenous King Vickers RN    3/6/2018 1220 New Bag (none) Intravenous Rashawn Specking      Pantoprazole Sodium (PROTONIX) 40 mg in Sodium Chloride 0.9 % 10 mL IV push     Date Action Dose Route U

## 2018-03-07 NOTE — PROGRESS NOTES
[facility] Pharmacy Note: Route Optimization for Pantoprazole (PROTONIX)    Patient is currently on Pantoprazole (PROTONIX) 40 mg IV every 24 hours.    The patient meets the criteria to convert to the oral equivalent as established by the IV to Oral conversion pro

## 2018-03-07 NOTE — PROGRESS NOTES
AM Hgb 6.7, patient still having consistent AURORA drainage with total of 525 overnight. BP and HR remain stable with Hgb drop.   RN also mentioned noting a portion of \"fat-like\" material noted in AURORA of left breast.  Breast exam unchanged, further drainage u

## 2018-03-07 NOTE — PLAN OF CARE
Patient and family educated on blood transfusion and updated on lab results. Pt hesitant on receiving a blood transfusion at this time despite dizziness and would like to hold off and recheck hgb this evening. Per dr Zia Marin call if less than 7.5.

## 2018-03-07 NOTE — PROGRESS NOTES
PLASTICS     This is a 50year old female that is POD #2 S/P her bilateral mastectomy Demond Johnson) with bilateral GOPAL free flap breast reconstruction Heydi Stanford) and ventral hernia repair with mesh (Dr. Carlos Albetro Stone).  Diet is advancing per general surgery team.. She fee

## 2018-03-08 LAB
BUN BLD-MCNC: 5 MG/DL (ref 8–20)
CALCIUM BLD-MCNC: 7.9 MG/DL (ref 8.3–10.3)
CHLORIDE: 101 MMOL/L (ref 101–111)
CO2: 31 MMOL/L (ref 22–32)
CREAT BLD-MCNC: 0.4 MG/DL (ref 0.55–1.02)
ERYTHROCYTE [DISTWIDTH] IN BLOOD BY AUTOMATED COUNT: 12.8 % (ref 11.5–16)
GLUCOSE BLD-MCNC: 105 MG/DL (ref 70–99)
HAV IGM SER QL: 2.1 MG/DL (ref 1.7–3)
HCT VFR BLD AUTO: 25.6 % (ref 34–50)
HGB BLD-MCNC: 8.8 G/DL (ref 12–16)
HGB BLD-MCNC: 9.6 G/DL (ref 12–16)
MCH RBC QN AUTO: 30.3 PG (ref 27–33.2)
MCHC RBC AUTO-ENTMCNC: 34.4 G/DL (ref 31–37)
MCV RBC AUTO: 88.3 FL (ref 81–100)
PHOSPHATE SERPL-MCNC: 2 MG/DL (ref 2.5–4.9)
PLATELET # BLD AUTO: 81 10(3)UL (ref 150–450)
POTASSIUM SERPL-SCNC: 3.3 MMOL/L (ref 3.6–5.1)
POTASSIUM SERPL-SCNC: 3.7 MMOL/L (ref 3.6–5.1)
PROCALCITONIN SERPL-MCNC: 0.17 NG/ML (ref ?–0.11)
RBC # BLD AUTO: 2.9 X10(6)UL (ref 3.8–5.1)
RED CELL DISTRIBUTION WIDTH-SD: 41.1 FL (ref 35.1–46.3)
SODIUM SERPL-SCNC: 138 MMOL/L (ref 136–144)
WBC # BLD AUTO: 4.2 X10(3) UL (ref 4–13)

## 2018-03-08 PROCEDURE — 80048 BASIC METABOLIC PNL TOTAL CA: CPT | Performed by: INTERNAL MEDICINE

## 2018-03-08 PROCEDURE — 85018 HEMOGLOBIN: CPT | Performed by: INTERNAL MEDICINE

## 2018-03-08 PROCEDURE — 85027 COMPLETE CBC AUTOMATED: CPT | Performed by: INTERNAL MEDICINE

## 2018-03-08 PROCEDURE — 94667 MNPJ CHEST WALL 1ST: CPT

## 2018-03-08 PROCEDURE — 84132 ASSAY OF SERUM POTASSIUM: CPT | Performed by: NURSE PRACTITIONER

## 2018-03-08 PROCEDURE — 84145 PROCALCITONIN (PCT): CPT | Performed by: INTERNAL MEDICINE

## 2018-03-08 PROCEDURE — 94668 MNPJ CHEST WALL SBSQ: CPT

## 2018-03-08 PROCEDURE — 84100 ASSAY OF PHOSPHORUS: CPT | Performed by: INTERNAL MEDICINE

## 2018-03-08 PROCEDURE — 83735 ASSAY OF MAGNESIUM: CPT | Performed by: INTERNAL MEDICINE

## 2018-03-08 RX ORDER — DOCUSATE SODIUM 100 MG/1
100 CAPSULE, LIQUID FILLED ORAL 2 TIMES DAILY
Qty: 60 CAPSULE | Refills: 0 | Status: SHIPPED | OUTPATIENT
Start: 2018-03-08 | End: 2019-02-15

## 2018-03-08 RX ORDER — HYDROMORPHONE HYDROCHLORIDE 1 MG/ML
0.4 INJECTION, SOLUTION INTRAMUSCULAR; INTRAVENOUS; SUBCUTANEOUS EVERY 2 HOUR PRN
Status: DISCONTINUED | OUTPATIENT
Start: 2018-03-08 | End: 2018-03-09

## 2018-03-08 RX ORDER — POLYETHYLENE GLYCOL 3350 17 G/17G
17 POWDER, FOR SOLUTION ORAL DAILY
Status: DISCONTINUED | OUTPATIENT
Start: 2018-03-08 | End: 2018-03-09

## 2018-03-08 RX ORDER — POTASSIUM CHLORIDE 20 MEQ/1
40 TABLET, EXTENDED RELEASE ORAL EVERY 4 HOURS
Status: DISCONTINUED | OUTPATIENT
Start: 2018-03-08 | End: 2018-03-08

## 2018-03-08 RX ORDER — ONDANSETRON 4 MG/1
4 TABLET, FILM COATED ORAL EVERY 8 HOURS PRN
Qty: 20 TABLET | Refills: 1 | Status: SHIPPED | OUTPATIENT
Start: 2018-03-08 | End: 2018-03-16

## 2018-03-08 RX ORDER — HYDROCODONE BITARTRATE AND ACETAMINOPHEN 5; 325 MG/1; MG/1
1-2 TABLET ORAL EVERY 4 HOURS PRN
Qty: 40 TABLET | Refills: 0 | Status: SHIPPED | OUTPATIENT
Start: 2018-03-08 | End: 2018-03-16 | Stop reason: ALTCHOICE

## 2018-03-08 RX ORDER — POTASSIUM CHLORIDE 20 MEQ/1
40 TABLET, EXTENDED RELEASE ORAL ONCE
Status: COMPLETED | OUTPATIENT
Start: 2018-03-08 | End: 2018-03-08

## 2018-03-08 NOTE — PROGRESS NOTES
[facility]  Progress Note    Gladys Ayla Patient Status:  Inpatient    1970 MRN [id number]   [facility] 4SW-A Attending Kristyn Gastelum MD   Hosp Day # 3 PCP Gama Singleton     Subjective:  Patient sitting up in chair.  Received tw 6. Continue drain care and monitoring - per plastics   7. DVT prophylaxis  8. Hopeful for d/c tomorrow     My total face time with this patient was 22 minutes.   Greater than half of our visit was spent in counseling the patient on the above listed diagno

## 2018-03-08 NOTE — PAYOR COMM NOTE
--------------  CONTINUED STAY REVIEW    Payor: [address] [place] [address]  Subscriber #:  [id number]  Authorization Number: [id number]     Admit date: 3/5/18  Admit time: 2145    Admitting Physician: Nayana Menon MD  Attending Physician:  SUKHDEEP Velásquez 6. Lovenox for VTE prophylaxis  7. ASA 81mg for anti-platelet effect  8. Medical management per Dr. Antonia Quick team  9. If ok with Dr. Nick Memory team, anticipation of D/C home tomorrow if tolerating orals, general diet, ambulating, and Hgb remains stable. Cultures: MRSA negative     Radiology:  Chest x-rays reviewed        Medications reviewed      Assessment and Plan:   Patient Active Problem List:     Increased risk of breast cancer        Assessment and Plan:     1. Status post bilateral prophylactic ski $ RT Standby Charge (per 15 min)    1             PEP             MK         $ Aerosol             $ [place] HF Adult Cannula Initial             Oxygen on stand-by?              O2 Device  Nasal cannula  Nasal cannula  Nasal cannula  None (Room air)  None (Room 3/7/2018 1800 Rate/Dose Verify (none) Intravenous Andre Jeronimo RN    3/7/2018 1700 Rate/Dose Verify (none) Intravenous Andre Jeronimo RN    3/7/2018 1600 Rate/Dose Verify (none) Intravenous Andre Jeronimo RN    3/7/2018 1500 Rate/Dose Verify (none) I

## 2018-03-08 NOTE — PLAN OF CARE
HEMATOLOGIC - ADULT    • Maintains hematologic stability Not Progressing          CARDIOVASCULAR - ADULT    • Maintains optimal cardiac output and hemodynamic stability Progressing          PAIN - ADULT    • Verbalizes/displays adequate comfort level or pa

## 2018-03-08 NOTE — PROGRESS NOTES
[facility]  Progress Note    Jalen Britton Patient Status:  Inpatient    1970 MRN [id number]   [facility] 4SW-A Attending Joanne Felipe MD   [facility] Day # 3 PCP Martin Garcia     Subjective:  Jalen Britton is a(n) 50year old female o breast cancer      Assessment and Plan:     1. Status post bilateral prophylactic skin sparing mastectomies with immediate breast reconstruction with abdominal free flap  2. Anemia with thrombocytopenia-improved post transfusion with plans to follow  3.  Ve

## 2018-03-08 NOTE — PROGRESS NOTES
PLASTICS     This is a 50year old female that is POD #3 S/P her bilateral mastectomy Steffanie Bhupinder) with bilateral GOPAL free flap breast reconstruction Royce Gallego) and ventral hernia repair with mesh (Dr. Macie Nageotte).  Diet is advancing per general surgery team.. She fee

## 2018-03-09 ENCOUNTER — APPOINTMENT (OUTPATIENT)
Dept: GENERAL RADIOLOGY | Facility: HOSPITAL | Age: 48
DRG: 585 | End: 2018-03-09
Attending: INTERNAL MEDICINE
Payer: COMMERCIAL

## 2018-03-09 VITALS
DIASTOLIC BLOOD PRESSURE: 68 MMHG | SYSTOLIC BLOOD PRESSURE: 132 MMHG | BODY MASS INDEX: 32.76 KG/M2 | WEIGHT: 166.88 LBS | RESPIRATION RATE: 16 BRPM | OXYGEN SATURATION: 100 % | HEIGHT: 60 IN | HEART RATE: 91 BPM | TEMPERATURE: 100 F

## 2018-03-09 LAB
ALBUMIN SERPL-MCNC: 1.7 G/DL (ref 3.5–4.8)
ALP LIVER SERPL-CCNC: 89 U/L (ref 39–100)
ALT SERPL-CCNC: 19 U/L (ref 14–54)
AST SERPL-CCNC: 33 U/L (ref 15–41)
BASOPHILS # BLD AUTO: 0 X10(3) UL (ref 0–0.1)
BASOPHILS NFR BLD AUTO: 0 %
BILIRUB SERPL-MCNC: 0.4 MG/DL (ref 0.1–2)
BLOOD TYPE BARCODE: 5100
BUN BLD-MCNC: 5 MG/DL (ref 8–20)
CALCIUM BLD-MCNC: 8.7 MG/DL (ref 8.3–10.3)
CHLORIDE: 106 MMOL/L (ref 101–111)
CO2: 26 MMOL/L (ref 22–32)
CREAT BLD-MCNC: 0.41 MG/DL (ref 0.55–1.02)
EOSINOPHIL # BLD AUTO: 0.09 X10(3) UL (ref 0–0.3)
EOSINOPHIL NFR BLD AUTO: 1.6 %
ERYTHROCYTE [DISTWIDTH] IN BLOOD BY AUTOMATED COUNT: 13.3 % (ref 11.5–16)
GLUCOSE BLD-MCNC: 96 MG/DL (ref 70–99)
HCT VFR BLD AUTO: 29.4 % (ref 34–50)
HGB BLD-MCNC: 9.2 G/DL (ref 12–16)
HGB BLD-MCNC: 9.8 G/DL (ref 12–16)
IMMATURE GRANULOCYTE COUNT: 0.03 X10(3) UL (ref 0–1)
IMMATURE GRANULOCYTE RATIO %: 0.5 %
LYMPHOCYTES # BLD AUTO: 0.82 X10(3) UL (ref 0.9–4)
LYMPHOCYTES NFR BLD AUTO: 14.6 %
M PROTEIN MFR SERPL ELPH: 5.4 G/DL (ref 6.1–8.3)
MCH RBC QN AUTO: 30.2 PG (ref 27–33.2)
MCHC RBC AUTO-ENTMCNC: 33.3 G/DL (ref 31–37)
MCV RBC AUTO: 90.5 FL (ref 81–100)
MONOCYTES # BLD AUTO: 0.34 X10(3) UL (ref 0.1–1)
MONOCYTES NFR BLD AUTO: 6 %
NEUTROPHIL ABS PRELIM: 4.34 X10 (3) UL (ref 1.3–6.7)
NEUTROPHILS # BLD AUTO: 4.34 X10(3) UL (ref 1.3–6.7)
NEUTROPHILS NFR BLD AUTO: 77.3 %
PLATELET # BLD AUTO: 83 10(3)UL (ref 150–450)
POTASSIUM SERPL-SCNC: 4.1 MMOL/L (ref 3.6–5.1)
POTASSIUM SERPL-SCNC: 4.1 MMOL/L (ref 3.6–5.1)
RBC # BLD AUTO: 3.25 X10(6)UL (ref 3.8–5.1)
RED CELL DISTRIBUTION WIDTH-SD: 43.8 FL (ref 35.1–46.3)
SODIUM SERPL-SCNC: 136 MMOL/L (ref 136–144)
WBC # BLD AUTO: 5.6 X10(3) UL (ref 4–13)

## 2018-03-09 PROCEDURE — 80053 COMPREHEN METABOLIC PANEL: CPT | Performed by: INTERNAL MEDICINE

## 2018-03-09 PROCEDURE — 84132 ASSAY OF SERUM POTASSIUM: CPT | Performed by: NURSE PRACTITIONER

## 2018-03-09 PROCEDURE — P9047 ALBUMIN (HUMAN), 25%, 50ML: HCPCS

## 2018-03-09 PROCEDURE — 85025 COMPLETE CBC W/AUTO DIFF WBC: CPT | Performed by: INTERNAL MEDICINE

## 2018-03-09 PROCEDURE — 71045 X-RAY EXAM CHEST 1 VIEW: CPT | Performed by: INTERNAL MEDICINE

## 2018-03-09 RX ORDER — ALBUMIN (HUMAN) 12.5 G/50ML
SOLUTION INTRAVENOUS
Status: COMPLETED
Start: 2018-03-09 | End: 2018-03-09

## 2018-03-09 NOTE — DISCHARGE SUMMARY
The patient is a 50year-old female with a history of the BRCA gene and ventral hernia.  The patient was admitted to the hospital and taken to the operating room on 3/5/18 for bilateral mastectomy Jamie Smith, immediate breast reconstruction with GOPAL free fla

## 2018-03-09 NOTE — PLAN OF CARE
POD4. Pt ambulating and up in chair. tolerating PO intake. IV sl'd. Passing gas and +bs but no bm yet. hgb stable. Pain controlled w/ norco. Plan for dc home today. Rx's signed and given to pt and . Dc instructions reviewed.  AURORA log given and pt inst

## 2018-03-09 NOTE — PROGRESS NOTES
[facility]  Progress Note    Josue Honeycutt Patient Status:  Inpatient    1970 MRN [id number]   [facility] 4SW-A Attending Krystal Riggs MD   [facility] Day # 4 PCP Rahel Simpson     Subjective:  Patient sitting up in chair.  Pt feeling general diet  2. Encouraged ambulation   3. Antiemetics as needed  4. Pain control   5. Add miralax to bowel regimen   6. Continue drain care and monitoring - per plastics   7. DVT prophylaxis  8.  Plan for DC home today, follow up in 1 week - DC instructio

## 2018-03-09 NOTE — CM/SW NOTE
Patient was screened during rounds and no needs are identified at this time. RN to contact SW/CM if needs arise.   Dayana Verdugo, 03/09/18, 12:14 PM

## 2018-03-09 NOTE — PAYOR COMM NOTE
--------------  CONTINUED STAY REVIEW    Payor: [facility]  Subscriber #:  [id number]  Authorization Number: [id number]     Admit date: 3/5/18  Admit time: 2145    Admitting Physician: Srinivasa Montgomery MD  Attending Physician:  SUKHDEEP Rivers Action Dose Route User    3/8/2018 7538 Given 40 mEq Oral Jacob Cole RN            Plan: 3/9  1. Status post bilateral prophylactic skin sparing mastectomies with immediate breast reconstruction with abdominal free flap  2.  Anemia with thrombocytope

## 2018-03-09 NOTE — PROGRESS NOTES
[facility]  Progress Note    Karyle Marker Patient Status:  Inpatient    1970 MRN [id number]   [facility] 4SW-A Attending Eliecer John MD   [facility] Day # 4 PCP Lynette Barrientos     Subjective:  Karyle Marker is a(n) 50year old female. 03/07/18   1818  03/08/18   0418  03/08/18   1525  03/08/18   2358  03/09/18   0435   RBC  2.18*  1.93*  2.90*   --    --   3.25*   HGB  6.7*  5.8*  8.8*  9.6*  9.2*  9.8*   HCT  20.2*  17.8*  25.6*   --    --   29.4*   MCV  92.7  92.2  88.3   --    --   9 complaints to suggest bacterial infection.     Plan to follow hemoglobins carefully  Follow fevers  continue bronchopulmonary toilet    Discussion: The patient is progressing well except for her fever.   Should a decision to discharge be made, she should be

## 2018-03-09 NOTE — PAYOR COMM NOTE
--------------  DISCHARGE REVIEW    Payor: [address] [facility]  Subscriber #:  [id number]  Authorization Number: [id number]     Admit date: 3/5/18  Admit time:  2145  Discharge Date: 3/9/2018  1:00 PM    PLEASE LET US KNOW DAYS OF AUTHORIZATION.   Anai Brush

## 2018-03-09 NOTE — PROGRESS NOTES
PLASTICS     This is a 50year old female that is POD #4 S/P her bilateral mastectomy Kerri Beck) with bilateral GOPAL free flap breast reconstruction Miguel Angel Willoughby) and ventral hernia repair with mesh (Dr. Keira Shankar).  Diet is advancing per general surgery team. She ryder discharge instructions with her and her family  6. Give drain log and teach how to record daily drain output and how to strip drains  12.  Follow up with Sage Cade PA-C on 3/16/18

## 2018-03-09 NOTE — PLAN OF CARE
Assumed care at 0700. Pt alert and oriented x4. On room air during the day. When pt is sleeping she dips down to 85%. Uses 2L NC when sleeping. Lungs diminished with some crackles in RLL. Dr. Yoni Rosario aware. Using IS from 500-750.  Low grade temps and Tmax of

## 2018-03-11 LAB — BLOOD TYPE BARCODE: 5100

## 2018-03-12 ENCOUNTER — TELEPHONE (OUTPATIENT)
Dept: SURGERY | Facility: CLINIC | Age: 48
End: 2018-03-12

## 2018-03-12 NOTE — TELEPHONE ENCOUNTER
The patient called with concerns of bruising to the left breast that started yesterday. She denies fever, chills or spreading erythema at this time. She is agreeable to send photos for Dr. Chin Hemphill and our PA to evaluate.  Our PA was notified and will follow u

## 2018-03-14 ENCOUNTER — OFFICE VISIT (OUTPATIENT)
Dept: SURGERY | Facility: CLINIC | Age: 48
End: 2018-03-14

## 2018-03-14 DIAGNOSIS — Z91.89 INCREASED RISK OF BREAST CANCER: Primary | ICD-10-CM

## 2018-03-14 DIAGNOSIS — Z90.13 ABSENCE OF BREAST, BILATERAL: ICD-10-CM

## 2018-03-14 PROCEDURE — 99024 POSTOP FOLLOW-UP VISIT: CPT | Performed by: SURGERY

## 2018-03-14 NOTE — PROGRESS NOTES
Sujit Lee is a 50year old female who presents today for a follow-up after immediate BL GOPAL free flap 10 days ago. She had noticed increased bruising on L breast on Monday and now is here for f/u  She denies fever and chills.  She denies nausea, vomitin

## 2018-03-16 ENCOUNTER — OFFICE VISIT (OUTPATIENT)
Dept: SURGERY | Facility: CLINIC | Age: 48
End: 2018-03-16

## 2018-03-16 VITALS
TEMPERATURE: 99 F | BODY MASS INDEX: 25.1 KG/M2 | WEIGHT: 147 LBS | HEIGHT: 64 IN | SYSTOLIC BLOOD PRESSURE: 115 MMHG | DIASTOLIC BLOOD PRESSURE: 76 MMHG | HEART RATE: 91 BPM

## 2018-03-16 VITALS
BODY MASS INDEX: 24.86 KG/M2 | OXYGEN SATURATION: 100 % | DIASTOLIC BLOOD PRESSURE: 78 MMHG | WEIGHT: 145.63 LBS | TEMPERATURE: 99 F | HEART RATE: 92 BPM | SYSTOLIC BLOOD PRESSURE: 130 MMHG | HEIGHT: 64.02 IN

## 2018-03-16 VITALS
SYSTOLIC BLOOD PRESSURE: 115 MMHG | RESPIRATION RATE: 20 BRPM | DIASTOLIC BLOOD PRESSURE: 76 MMHG | HEART RATE: 91 BPM | BODY MASS INDEX: 25.1 KG/M2 | TEMPERATURE: 99 F | WEIGHT: 147 LBS | HEIGHT: 64.02 IN

## 2018-03-16 DIAGNOSIS — Z15.09 BRCA1 GENE MUTATION POSITIVE: Primary | ICD-10-CM

## 2018-03-16 DIAGNOSIS — Z90.13 S/P BILATERAL MASTECTOMY: ICD-10-CM

## 2018-03-16 DIAGNOSIS — Z87.19 S/P REPAIR OF VENTRAL HERNIA: ICD-10-CM

## 2018-03-16 DIAGNOSIS — Z98.890 S/P REPAIR OF VENTRAL HERNIA: ICD-10-CM

## 2018-03-16 DIAGNOSIS — Z98.890 S/P BREAST RECONSTRUCTION, BILATERAL: ICD-10-CM

## 2018-03-16 DIAGNOSIS — Z90.13 ABSENCE OF BOTH BREASTS: Primary | ICD-10-CM

## 2018-03-16 DIAGNOSIS — Z15.01 BRCA1 GENE MUTATION POSITIVE: Primary | ICD-10-CM

## 2018-03-16 DIAGNOSIS — K43.2 INCISIONAL HERNIA OF ANTERIOR ABDOMINAL WALL WITHOUT OBSTRUCTION OR GANGRENE: Primary | ICD-10-CM

## 2018-03-16 PROBLEM — Z90.10 ABSENCE OF BREAST: Status: ACTIVE | Noted: 2018-03-16

## 2018-03-16 PROCEDURE — 99024 POSTOP FOLLOW-UP VISIT: CPT | Performed by: SURGERY

## 2018-03-16 NOTE — PROGRESS NOTES
This is a 50year old female that is POD #11 S/P her bilateral mastectomy Zulemagiuliana Antoine) with bilateral GOPAL free flap breast reconstruction Monica Kim) and ventral hernia repair with mesh (Dr. Sukh Easley).  She is here for possible drain removal today as well as another expanding and appears to be old and clinically stable with no fresh blood. Nevertheless I told her to keep a close eye on this and if any expansion is noted she needs to see us before her next scheduled postop visit.  She will see us next week for drain ev

## 2018-03-16 NOTE — PROGRESS NOTES
Post Operative Visit Note       Active Problems  1. Incisional hernia of anterior abdominal wall without obstruction or gangrene    2. S/P bilateral mastectomy    3. S/P breast reconstruction, bilateral    4.  S/P repair of ventral hernia         Chief Comp Glasses of wine: 1 per week    Drug use: No                 Current Outpatient Prescriptions:  docusate sodium 100 MG Oral Cap Take 1 capsule (100 mg total) by mouth 2 (two) times daily.  Disp: 60 capsule Rfl: 0   aspirin 81 MG Oral Tab Take 1 tablet (81 mg Abdominal: Soft. Bowel sounds are normal. She exhibits no distension. There is tenderness. There is no rebound and no guarding. Periumbilical incision is healing well.   Umbilicus appears well vascularized  Infraumbilical transverse incision is also int

## 2018-03-16 NOTE — PAYOR COMM NOTE
--------------  DISCHARGE REVIEW    Payor: [facility]  Subscriber #:  [id number]  Authorization Number: [id number]     Admit date: 3/5/18  Admit time:  2145  Discharge Date: 3/9/2018  1:00 PM     Admitting Physician: Joan Garcia MD  Attendi Admit time:  2145  Discharge Date: 3/9/2018  1:00 PM

## 2018-03-21 ENCOUNTER — OFFICE VISIT (OUTPATIENT)
Dept: SURGERY | Facility: CLINIC | Age: 48
End: 2018-03-21

## 2018-03-21 DIAGNOSIS — Z90.13 ABSENCE OF BOTH BREASTS: Primary | ICD-10-CM

## 2018-03-21 PROCEDURE — 99024 POSTOP FOLLOW-UP VISIT: CPT | Performed by: SURGERY

## 2018-03-21 NOTE — PROGRESS NOTES
Keith Crowe is a 50year old female who presents today for a follow-up after  BL GOPAL free flap 2.5weeks ago. Her R breast still has some residual bruising on inferior pole but is soft and near symmetric to R breast  She denies fever and chills.  She denie

## 2018-03-23 NOTE — PROGRESS NOTES
Breast Surgery Post-Operative Visit    Diagnosis: BRCA1 mutation. Status post bilateral skin sparing prophylactic mastectomies and removal of chest wall port and flap based reconstruction on March 5, 2018.     Stage: N/A    Disease Status:  Surgical treatm Menopause: Total hysterectomy in 2016  She denies any history of hormone replacement therapy. She denies any history of oral contraceptive use. She denies infertility treatment to achieve pregnancy.     Medications:      Current Outpatient Prescriptions o irregular heartbeat, fainting or near-fainting, difficulty breathing when lying flat, SOB/Coughing at night, swelling of the legs or chest pain while walking.     Breasts:  See history of present illness    Gastrointestinal:     There is no history of diffi Examination:   Examination shows that the surgical sites are clean, dry, and healing well.       Impression:   Ms. Hortencia Clark is a 50year old woman presents with a personal history of ovarian cancer and confirmed BRCA1 mutation status post bilateral maste

## 2018-04-11 ENCOUNTER — OFFICE VISIT (OUTPATIENT)
Dept: SURGERY | Facility: CLINIC | Age: 48
End: 2018-04-11

## 2018-04-11 DIAGNOSIS — Z90.13 ABSENCE OF BOTH BREASTS: Primary | ICD-10-CM

## 2018-04-11 PROCEDURE — 99024 POSTOP FOLLOW-UP VISIT: CPT | Performed by: SURGERY

## 2018-04-11 NOTE — PROGRESS NOTES
Luz Gray is a 50year old female who presents today for a follow-up after BL GOPAL free flap and ventral hernia repair with mesh 6 weeks ago  Pt has small open area on umbilicus that is still draining, still feels abdominal wall tightness,  otherwise no

## 2018-04-11 NOTE — PROGRESS NOTES
I called the patient's preferred [facility] pharmacy and spoke with the pharmacist, [address] [place] [address]. I placed an order for Silver Sulfadiazine cream 1% (largest tube), no refills- and Iodoform packing to be used daily as directed to the umbilical wound per Dr. Johnny Camarena.

## 2018-04-19 ENCOUNTER — TELEPHONE (OUTPATIENT)
Dept: SURGERY | Facility: CLINIC | Age: 48
End: 2018-04-19

## 2018-04-19 NOTE — TELEPHONE ENCOUNTER
Pt calling regarding her upcoming trip to [place] this weekend. Per Dr. Ernestina Riggins, pt to keep belly button out of the ocean and pool. Pt made aware, agreed and understood. PT will bring extra wound care supplies on her trip.

## 2018-05-03 ENCOUNTER — MEDICAL CORRESPONDENCE (OUTPATIENT)
Dept: SURGERY | Facility: CLINIC | Age: 48
End: 2018-05-03

## 2018-05-03 NOTE — PROGRESS NOTES
This is a 50year old female that is 8.5 weeks S/P her bilateral mastectomy Akash Zacarias) with bilateral GOPAL free flap breast reconstruction Mini Ambriz) and ventral hernia repair with mesh (Dr. Denisse Walker). She is here for a wound check today.  She has been using Silver

## 2018-05-04 ENCOUNTER — OFFICE VISIT (OUTPATIENT)
Dept: SURGERY | Facility: CLINIC | Age: 48
End: 2018-05-04

## 2018-05-04 VITALS — TEMPERATURE: 98 F | HEIGHT: 64 IN | WEIGHT: 146.19 LBS | BODY MASS INDEX: 24.96 KG/M2

## 2018-05-04 DIAGNOSIS — Z90.13 ABSENCE OF BOTH BREASTS: Primary | ICD-10-CM

## 2018-05-04 PROCEDURE — 99024 POSTOP FOLLOW-UP VISIT: CPT | Performed by: PHYSICIAN ASSISTANT

## 2018-05-16 ENCOUNTER — MEDICAL CORRESPONDENCE (OUTPATIENT)
Dept: SURGERY | Facility: CLINIC | Age: 48
End: 2018-05-16

## 2018-05-16 ENCOUNTER — OFFICE VISIT (OUTPATIENT)
Dept: SURGERY | Facility: CLINIC | Age: 48
End: 2018-05-16

## 2018-05-16 DIAGNOSIS — Z90.13 ABSENCE OF BOTH BREASTS: Primary | ICD-10-CM

## 2018-05-16 PROCEDURE — 99024 POSTOP FOLLOW-UP VISIT: CPT | Performed by: SURGERY

## 2018-05-16 NOTE — PROGRESS NOTES
Pt brought in a thumb drive to have progress photos put on. Photo's from Pt consult: 12/1/17 as well as Post GOPAL-follow up: 5/15/18 photo's were loaded onto removable thumb drive and given to Pt.

## 2018-05-16 NOTE — PROGRESS NOTES
This is a 50year old female that is 6 weeks S/P her bilateral mastectomy Akash Zacarias) with bilateral GOPAL free flap breast reconstruction Mini Ambriz) and ventral hernia repair with mesh (Dr. Denisse Walker). She is here for a wound check today of the umbilicus.  She is no

## 2018-05-23 ENCOUNTER — MEDICAL CORRESPONDENCE (OUTPATIENT)
Dept: SURGERY | Facility: CLINIC | Age: 48
End: 2018-05-23

## 2018-05-23 NOTE — PROGRESS NOTES
Faxed over signed progress note from [facility], to [facility] @ [facility] @ [phone]. Fax confirmation received.

## 2018-07-12 ENCOUNTER — MEDICAL CORRESPONDENCE (OUTPATIENT)
Dept: SURGERY | Facility: CLINIC | Age: 48
End: 2018-07-12

## 2018-07-12 NOTE — PROGRESS NOTES
Faxed progress note singed by Dr. Alondra Solitario back to [facility] on 7/11/18. Fax confirmation received.

## 2018-08-15 ENCOUNTER — OFFICE VISIT (OUTPATIENT)
Dept: SURGERY | Facility: CLINIC | Age: 48
End: 2018-08-15
Payer: COMMERCIAL

## 2018-08-15 DIAGNOSIS — Z90.13 ABSENCE OF BOTH BREASTS: Primary | ICD-10-CM

## 2018-08-15 PROCEDURE — 99213 OFFICE O/P EST LOW 20 MIN: CPT | Performed by: SURGERY

## 2018-08-15 NOTE — CONSULTS
Estabilshed Patient Consultation    Chief Complaint: absence of both breasts    History of Present Illness:   Luz Gray is a 46year old female who returns to the office after BL SSM and immediate GOPAL free flap reconstruction and hernia repair.  3 months compared to R    Respiratory: Normal respiratory effort. Cardiovascular: no cyanosis, no edema    Gastrointestinal: no hernias, BL excess tissue flanks    Lymphatic:  There is no cervical, supraclavicular, or axillary lymphadenopathy appreciated.     Mu

## 2019-02-15 ENCOUNTER — OFFICE VISIT (OUTPATIENT)
Dept: SURGERY | Facility: CLINIC | Age: 49
End: 2019-02-15
Payer: COMMERCIAL

## 2019-02-15 VITALS — WEIGHT: 147.81 LBS | BODY MASS INDEX: 25.24 KG/M2 | HEIGHT: 64 IN

## 2019-02-15 DIAGNOSIS — Z90.13 ABSENCE OF BOTH BREASTS: Primary | ICD-10-CM

## 2019-02-15 PROCEDURE — 99213 OFFICE O/P EST LOW 20 MIN: CPT | Performed by: SURGERY

## 2019-02-15 NOTE — PROGRESS NOTES
Surgery and wash instructions verbally reviewed with the patient and written instructions were also provided. The patient understands the need to obtain medical clearance for this procedure and plans to see Dr. Rhetta Boxer (oncology) for this.  Her PCP, however,

## 2019-02-15 NOTE — CONSULTS
Estabilshed Patient Consultation    Chief Complaint: absence of both breasts  History of Present Illness:   Brant Jenkins is a 46year old female who returns to the office after BL GOPAL free flap reconstruction in may 2018.   Patient is here for revision proc normal.     Psychiatric: Affect is appropriate. Eyes: Conjunctiva are clear, non-icteric.  PERRL    ENT: no obvious abnormality, no ear drainage, mucous membranes moist and pink    Integument/Skin: The skin appears normal. There are no suspicious appeari Fay Chen MD  2/15/2019  1:59 PM

## 2019-02-15 NOTE — PATIENT INSTRUCTIONS
Surgeon: Dr. Rimma Lozoya.  Astrid Johnson, PhD     Tel:  [phone]    Fax: [phone]     Surgery/Procedure: Revision of bilateral reconstructed breasts with reduction and mastopexy, liposuction and autologous fat-grafting from flanks and abdomen to the left axilla,

## 2019-02-20 ENCOUNTER — TELEPHONE (OUTPATIENT)
Dept: SURGERY | Facility: CLINIC | Age: 49
End: 2019-02-20

## 2019-02-21 DIAGNOSIS — Z90.13 ABSENCE OF BOTH BREASTS: Primary | ICD-10-CM

## 2019-03-13 ENCOUNTER — TELEPHONE (OUTPATIENT)
Dept: SURGERY | Facility: CLINIC | Age: 49
End: 2019-03-13

## 2019-03-13 NOTE — TELEPHONE ENCOUNTER
Spoke to Pt regarding need for medical clearance and she stated that she has her pre op clearance appointment tomorrow 3/14/19 @ 11 am with Dr. Veto Hunt. She will have the clearance faxed over as soon as possible. Confirmed the fax number with PT.     We maxine

## 2019-03-15 ENCOUNTER — NURSE ONLY (OUTPATIENT)
Dept: SURGERY | Facility: CLINIC | Age: 49
End: 2019-03-15

## 2019-03-15 NOTE — PROGRESS NOTES
The following email was sent to the patient as it is the preferred method of communication with this patient-     Juan José Medel-     I just wanted to tell you that Dr. Hamilton Carrel hem/onc clearance is all good- I am happy to see that your WBC count is within beni

## 2019-03-19 ENCOUNTER — NURSE ONLY (OUTPATIENT)
Dept: SURGERY | Facility: CLINIC | Age: 49
End: 2019-03-19

## 2019-03-19 NOTE — PROGRESS NOTES
Faxed EPIC request for medical clearance to Dr. Carla Ventura office, fax confirmation page received. I will anticipate the clearance.      The patient was notified and also encouraged to follow-up with their office regarding faxing the clearance to our office as

## 2019-03-21 ENCOUNTER — TELEPHONE (OUTPATIENT)
Dept: SURGERY | Facility: CLINIC | Age: 49
End: 2019-03-21

## 2019-03-21 ENCOUNTER — MEDICAL CORRESPONDENCE (OUTPATIENT)
Dept: SURGERY | Facility: CLINIC | Age: 49
End: 2019-03-21

## 2019-03-21 NOTE — PROGRESS NOTES
Received H & P from Arlin Guevara office. There is no statement of clearance attached.    Called Dr. Arlin Mast office to ask for them to send the  H & P with the clearance attached as an addendum to the progress note or something stating whether or not the joel

## 2019-03-21 NOTE — TELEPHONE ENCOUNTER
Received H&P along with medical clearance from patient's PCP Dr. Charles Schwartz for upcoming surgery with Dr. Fay Chen on 3/25/2019. Pt has been medically cleared for surgery. Faxed [facility] to [facility]. Confirmation received.

## 2019-03-24 ENCOUNTER — ANESTHESIA EVENT (OUTPATIENT)
Dept: SURGERY | Facility: HOSPITAL | Age: 49
End: 2019-03-24
Payer: COMMERCIAL

## 2019-03-25 ENCOUNTER — HOSPITAL ENCOUNTER (OUTPATIENT)
Facility: HOSPITAL | Age: 49
Setting detail: HOSPITAL OUTPATIENT SURGERY
Discharge: HOME | End: 2019-03-25
Attending: SURGERY | Admitting: SURGERY
Payer: COMMERCIAL

## 2019-03-25 ENCOUNTER — ANESTHESIA (OUTPATIENT)
Dept: SURGERY | Facility: HOSPITAL | Age: 49
End: 2019-03-25
Payer: COMMERCIAL

## 2019-03-25 VITALS
OXYGEN SATURATION: 100 % | RESPIRATION RATE: 16 BRPM | WEIGHT: 145.75 LBS | HEIGHT: 60 IN | TEMPERATURE: 98 F | DIASTOLIC BLOOD PRESSURE: 84 MMHG | BODY MASS INDEX: 28.61 KG/M2 | SYSTOLIC BLOOD PRESSURE: 131 MMHG | HEART RATE: 80 BPM

## 2019-03-25 DIAGNOSIS — Z90.13 ABSENCE OF BOTH BREASTS: ICD-10-CM

## 2019-03-25 PROCEDURE — 0HBT0ZX EXCISION OF RIGHT BREAST, OPEN APPROACH, DIAGNOSTIC: ICD-10-PCS | Performed by: SURGERY

## 2019-03-25 PROCEDURE — 88305 TISSUE EXAM BY PATHOLOGIST: CPT | Performed by: SURGERY

## 2019-03-25 PROCEDURE — 0HBU0ZX EXCISION OF LEFT BREAST, OPEN APPROACH, DIAGNOSTIC: ICD-10-PCS | Performed by: SURGERY

## 2019-03-25 RX ORDER — ACETAMINOPHEN 500 MG
1000 TABLET ORAL ONCE
Status: DISCONTINUED | OUTPATIENT
Start: 2019-03-25 | End: 2019-03-25

## 2019-03-25 RX ORDER — METOCLOPRAMIDE HYDROCHLORIDE 5 MG/ML
10 INJECTION INTRAMUSCULAR; INTRAVENOUS AS NEEDED
Status: DISCONTINUED | OUTPATIENT
Start: 2019-03-25 | End: 2019-03-25

## 2019-03-25 RX ORDER — ONDANSETRON 4 MG/1
4 TABLET, FILM COATED ORAL EVERY 8 HOURS PRN
Qty: 20 TABLET | Refills: 0 | Status: SHIPPED | OUTPATIENT
Start: 2019-03-25 | End: 2019-04-10

## 2019-03-25 RX ORDER — DEXAMETHASONE SODIUM PHOSPHATE 4 MG/ML
4 VIAL (ML) INJECTION AS NEEDED
Status: DISCONTINUED | OUTPATIENT
Start: 2019-03-25 | End: 2019-03-25

## 2019-03-25 RX ORDER — SODIUM CHLORIDE, SODIUM LACTATE, POTASSIUM CHLORIDE, CALCIUM CHLORIDE 600; 310; 30; 20 MG/100ML; MG/100ML; MG/100ML; MG/100ML
INJECTION, SOLUTION INTRAVENOUS CONTINUOUS
Status: DISCONTINUED | OUTPATIENT
Start: 2019-03-25 | End: 2019-03-25

## 2019-03-25 RX ORDER — DOCUSATE SODIUM 100 MG/1
100 CAPSULE, LIQUID FILLED ORAL 2 TIMES DAILY
Qty: 40 CAPSULE | Refills: 0 | Status: SHIPPED | OUTPATIENT
Start: 2019-03-25 | End: 2019-04-10

## 2019-03-25 RX ORDER — ACETAMINOPHEN 500 MG
1000 TABLET ORAL EVERY 6 HOURS PRN
COMMUNITY
End: 2019-04-10

## 2019-03-25 RX ORDER — NALOXONE HYDROCHLORIDE 0.4 MG/ML
80 INJECTION, SOLUTION INTRAMUSCULAR; INTRAVENOUS; SUBCUTANEOUS AS NEEDED
Status: DISCONTINUED | OUTPATIENT
Start: 2019-03-25 | End: 2019-03-25

## 2019-03-25 RX ORDER — HYDROCODONE BITARTRATE AND ACETAMINOPHEN 5; 325 MG/1; MG/1
1-2 TABLET ORAL EVERY 4 HOURS PRN
Qty: 40 TABLET | Refills: 0 | Status: SHIPPED | OUTPATIENT
Start: 2019-03-25 | End: 2019-04-10

## 2019-03-25 RX ORDER — LIDOCAINE HYDROCHLORIDE AND EPINEPHRINE 10; 10 MG/ML; UG/ML
INJECTION, SOLUTION INFILTRATION; PERINEURAL AS NEEDED
Status: DISCONTINUED | OUTPATIENT
Start: 2019-03-25 | End: 2019-03-25 | Stop reason: HOSPADM

## 2019-03-25 RX ORDER — HYDROMORPHONE HYDROCHLORIDE 1 MG/ML
0.4 INJECTION, SOLUTION INTRAMUSCULAR; INTRAVENOUS; SUBCUTANEOUS EVERY 5 MIN PRN
Status: DISCONTINUED | OUTPATIENT
Start: 2019-03-25 | End: 2019-03-25

## 2019-03-25 RX ORDER — ONDANSETRON 2 MG/ML
4 INJECTION INTRAMUSCULAR; INTRAVENOUS AS NEEDED
Status: DISCONTINUED | OUTPATIENT
Start: 2019-03-25 | End: 2019-03-25

## 2019-03-25 RX ORDER — CEFAZOLIN SODIUM/WATER 2 G/20 ML
SYRINGE (ML) INTRAVENOUS
Status: DISCONTINUED | OUTPATIENT
Start: 2019-03-25 | End: 2019-03-25 | Stop reason: HOSPADM

## 2019-03-25 RX ORDER — CEFAZOLIN SODIUM/WATER 2 G/20 ML
2 SYRINGE (ML) INTRAVENOUS ONCE
Status: COMPLETED | OUTPATIENT
Start: 2019-03-25 | End: 2019-03-25

## 2019-03-25 NOTE — BRIEF OP NOTE
Pre-Operative Diagnosis: Absence of both breasts [Z90.13]     Post-Operative Diagnosis: Absence of both breasts [Z90.13]      Procedure Performed:   Procedure(s):  Revision of bilateral reconstructed breasts with reduction and mastopexy, liposuction and

## 2019-03-25 NOTE — H&P
Dr. Mcrae Six H&P/surgical clearance from 3/21/19 reviewed. No interval changes. Risks and benefits discussed. Informed consent obtained. A copy of the full consent can be found in our office records. Patient wishes to proceed as planned.      Plan continues

## 2019-03-25 NOTE — ANESTHESIA POSTPROCEDURE EVALUATION
[address] Patient Status:  Hospital Outpatient Surgery   Age/Gender 46year old female MRN [id number]   Location [facility] Attending Krystal Santiago., MD   Hosp Day # 0 PCP Rahel Simpson       Anesthesia P

## 2019-03-25 NOTE — ANESTHESIA PREPROCEDURE EVALUATION
PRE-OP EVALUATION    Patient Name: Angelika Arredondo    Pre-op Diagnosis: Absence of both breasts [Z90.13]    Procedure(s):  Revision of bilateral reconstructed breasts with reduction and mastopexy, liposuction and   autologous fat-grafting from flanks and abdom      •      • HERNIA VENTRAL REPAIR N/A 3/5/2018    Performed by Mellissa Gan MD at [address] [facility] [address]   • HYSTERECTOMY     • MICRO FLAP Bilateral 3/5/2018    Performed by Ruma Cornell MD at [facility]   • MICRO FLAP Bilateral 3

## 2019-03-26 ENCOUNTER — TELEPHONE (OUTPATIENT)
Dept: SURGERY | Facility: CLINIC | Age: 49
End: 2019-03-26

## 2019-03-28 ENCOUNTER — TELEPHONE (OUTPATIENT)
Dept: SURGERY | Facility: CLINIC | Age: 49
End: 2019-03-28

## 2019-03-28 NOTE — OPERATIVE REPORT
[facility]    PATIENT'S NAME: SERGIO GRANADOS   ATTENDING PHYSICIAN: Cheryl Johnson MD   OPERATING PHYSICIAN: Cheryl Johnson MD   PATIENT ACCOUNT#:   [id number]    LOCATION:  [facility] 2 EDWP 10  MEDICAL RECORD #:   [id number]       DATE OF BIR abdomen, flanks down to the groin area. Then, the procedure was started with infiltration of tumescent solution; 1 L of LR was used with 50 mL of 1% plain lidocaine and 1 mL of epinephrine 1:100,000.   This was infiltrated in both flanks, upper and lower a Hemostasis was assured. Then, autologous fat grafting of 31 mL was performed into the indentation on the left axilla/breast junction and also a small amount on the medial aspect of the mastectomy skin flap.   Then, the skin envelope was tightened and parti

## 2019-03-28 NOTE — TELEPHONE ENCOUNTER
Pt calling with AURORA drainage s/p surgery on 3/25 with Dr. Ann Lockwood. Per patient, right AURORA drainage is 6 -10 cc for over 2 days and left AURORA drainage is around 30-40 cc. Instructed patient that AURORA drainage needs to be less than 20 cc for over 2 consecutive days.

## 2019-03-29 ENCOUNTER — OFFICE VISIT (OUTPATIENT)
Dept: SURGERY | Facility: CLINIC | Age: 49
End: 2019-03-29
Payer: COMMERCIAL

## 2019-03-29 DIAGNOSIS — Z90.13 ABSENCE OF BOTH BREASTS: Primary | ICD-10-CM

## 2019-03-29 PROCEDURE — 99024 POSTOP FOLLOW-UP VISIT: CPT | Performed by: SURGERY

## 2019-03-29 NOTE — PROGRESS NOTES
Lalito Barrera is a 46year old female who presents today for a follow-up after Bl reconstructed breast flap revision/ reduction   She denies fever and chills. She denies nausea, vomiting, diarrhea or constipation. Her pain is controlled.         Physical Ex

## 2019-04-10 ENCOUNTER — OFFICE VISIT (OUTPATIENT)
Dept: SURGERY | Facility: CLINIC | Age: 49
End: 2019-04-10
Payer: COMMERCIAL

## 2019-04-10 DIAGNOSIS — Z90.13 ABSENCE OF BOTH BREASTS: Primary | ICD-10-CM

## 2019-04-10 PROCEDURE — 99024 POSTOP FOLLOW-UP VISIT: CPT | Performed by: SURGERY

## 2019-04-10 NOTE — PROGRESS NOTES
Mendel Solis is a 46year old female who presents today for a follow-up after BL breast flap revision and fat grafting/ lipo      She denies fever and chills. She denies nausea, vomiting, diarrhea or constipation. Her pain is controlled.         Physical E

## 2019-07-31 ENCOUNTER — OFFICE VISIT (OUTPATIENT)
Dept: SURGERY | Facility: CLINIC | Age: 49
End: 2019-07-31
Payer: COMMERCIAL

## 2019-07-31 VITALS — BODY MASS INDEX: 28.74 KG/M2 | WEIGHT: 146.38 LBS | HEIGHT: 60 IN

## 2019-07-31 DIAGNOSIS — Z90.13 ABSENCE OF BOTH BREASTS: Primary | ICD-10-CM

## 2019-07-31 PROCEDURE — 99213 OFFICE O/P EST LOW 20 MIN: CPT | Performed by: SURGERY

## 2019-07-31 RX ORDER — MULTIVITAMIN
TABLET ORAL
COMMUNITY
End: 2019-10-21

## 2019-07-31 RX ORDER — CYANOCOBALAMIN (VITAMIN B-12) 1000 MCG
1 TABLET, EXTENDED RELEASE ORAL DAILY
COMMUNITY
End: 2019-10-21

## 2019-07-31 RX ORDER — ACETAMINOPHEN 500 MG
500 TABLET ORAL EVERY 6 HOURS PRN
COMMUNITY

## 2019-07-31 RX ORDER — AMOXICILLIN 250 MG
CAPSULE ORAL
COMMUNITY
End: 2019-10-21

## 2019-07-31 NOTE — CONSULTS
Estabilshed Patient Consultation    Chief Complaint absence of both breasts  History of Present Illness:   Mimi Hernandez is a 44year old female who returns to the office after bilateral mastectomy and GOPAL free flap reconstruction on both sides.   She had re Medications:      Multiple Vitamin (MULTI-VITAMIN DAILY) Oral Tab Take by mouth. Disp:  Rfl:    acetaminophen 500 MG Oral Tab Take 500 mg by mouth every 6 (six) hours as needed for Pain.  Disp:  Rfl:    Calcium Citrate-Vitamin D 315-250 MG-UNIT Ora recosntruction    Discussion and Plan:  The patient was counseled on the different treatment options. I discussed with her to consider revision surgery to further reduce the size of both flaps specifically the R and try to achieve better symmetry.  For

## 2019-07-31 NOTE — PROGRESS NOTES
PT given pre-op instructions for surgery. Surgical scrub wash and instructions provided. Pt instructed to see her PCP for medical clearance prior to surgery, pt agreed and understood.     Informed consent for the procedure reviewed and signed by the patient

## 2019-07-31 NOTE — PROGRESS NOTES
Surgeon: Dr. Marilyn Luciano.  Augustus Caraballo, PhD     Tel:  [phone]    Fax: [phone]     Surgery/Procedure: Revision and reduction of both reconstructed breasts, autologous fat grafting from abdomen, flanks, and possible thighs to left breast and axilla    Outpatie

## 2019-08-07 ENCOUNTER — TELEPHONE (OUTPATIENT)
Dept: SURGERY | Facility: CLINIC | Age: 49
End: 2019-08-07

## 2019-08-20 ENCOUNTER — TELEPHONE (OUTPATIENT)
Dept: SURGERY | Facility: CLINIC | Age: 49
End: 2019-08-20

## 2019-08-20 NOTE — TELEPHONE ENCOUNTER
Dr Marques Avalos agreed to performing the surgery on 10/10 at [facility]. 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 Will talk to Dr Marques Avalos again to see if we can offer another date in October.

## 2019-08-20 NOTE — TELEPHONE ENCOUNTER
Returning patient's call to schedule surgery. Offered dates in November. Patient shared that she is going to [facility] in December. She would like something in October at either location. Offered to talk to Dr Marques Avalos to see what we can offer.  Will f/up

## 2019-08-21 ENCOUNTER — TELEPHONE (OUTPATIENT)
Dept: SURGERY | Facility: CLINIC | Age: 49
End: 2019-08-21

## 2019-08-21 NOTE — TELEPHONE ENCOUNTER
Correction: Scheduling surgery on 10/24 at [facility] AND [facility]

## 2019-08-30 DIAGNOSIS — Z90.13 ABSENCE OF BOTH BREASTS: Primary | ICD-10-CM

## 2019-09-18 ENCOUNTER — NURSE ONLY (OUTPATIENT)
Dept: SURGERY | Facility: CLINIC | Age: 49
End: 2019-09-18

## 2019-10-04 ENCOUNTER — MEDICAL CORRESPONDENCE (OUTPATIENT)
Dept: SURGERY | Facility: CLINIC | Age: 49
End: 2019-10-04

## 2019-10-04 NOTE — PROGRESS NOTES
Faxed EPIC request for medical clearance to Dr. Tonya Noe's office, fax confirmation page received. I will anticipate the clearance.

## 2019-10-17 ENCOUNTER — TELEPHONE (OUTPATIENT)
Dept: SURGERY | Facility: CLINIC | Age: 49
End: 2019-10-17

## 2019-10-17 NOTE — TELEPHONE ENCOUNTER
Patient called and LVm that we have not received her medical clearance for her surgery. Will wait for patient call back.

## 2019-10-21 ENCOUNTER — TELEPHONE (OUTPATIENT)
Dept: SURGERY | Facility: CLINIC | Age: 49
End: 2019-10-21

## 2019-10-21 NOTE — TELEPHONE ENCOUNTER
Faxed EPIC request for H & P to Dr. Geraldo Flores office, fax confirmation page received. I will anticipate the clearance.      I called the patient to let her know that a letter of medical clearance and lab results (CBC and CMP) were received but a history and p

## 2019-10-21 NOTE — TELEPHONE ENCOUNTER
I called Dr. Nessa Butler office and spoke with [facility] who asked that I re-fax our clearance request as it has not been received since their fax machine is down-  The letter was reprinted and faxed again-    I called the office back to confirm the receipt on t

## 2019-10-22 ENCOUNTER — TELEPHONE (OUTPATIENT)
Dept: SURGERY | Facility: CLINIC | Age: 49
End: 2019-10-22

## 2019-10-22 NOTE — TELEPHONE ENCOUNTER
The following email was sent to the patient as her preferred method of communication:    Myra Saini,     I left you a voicemail-  I called Dr. Seng Kahn office and they are closed.    I have yet to receive your history and physical.   Please follow-up with their o

## 2019-10-22 NOTE — TELEPHONE ENCOUNTER
I called Dr. Dia Melendrez office to follow-up on her history and physical for medical clearance-   Their office is closed for the day and there is not an option to leave a voicemail.      I called the patient and LMOVM regarding no history and physical received

## 2019-10-23 ENCOUNTER — TELEPHONE (OUTPATIENT)
Dept: SURGERY | Facility: CLINIC | Age: 49
End: 2019-10-23

## 2019-10-23 NOTE — TELEPHONE ENCOUNTER
History and physical received by Dr. Clementina Michaels office-  Faxed as urgent to [facility] pre-admission testing and sent for STAT scanning with the help of our PSR.      The patient is aware that this has been received via email as her preferred method of communica

## 2019-10-24 ENCOUNTER — HOSPITAL ENCOUNTER (OUTPATIENT)
Facility: HOSPITAL | Age: 49
Setting detail: HOSPITAL OUTPATIENT SURGERY
Discharge: HOME | End: 2019-10-24
Attending: SURGERY | Admitting: SURGERY
Payer: COMMERCIAL

## 2019-10-24 ENCOUNTER — ANESTHESIA (OUTPATIENT)
Dept: SURGERY | Facility: HOSPITAL | Age: 49
End: 2019-10-24
Payer: COMMERCIAL

## 2019-10-24 ENCOUNTER — ANESTHESIA EVENT (OUTPATIENT)
Dept: SURGERY | Facility: HOSPITAL | Age: 49
End: 2019-10-24
Payer: COMMERCIAL

## 2019-10-24 VITALS
BODY MASS INDEX: 27.68 KG/M2 | DIASTOLIC BLOOD PRESSURE: 80 MMHG | OXYGEN SATURATION: 99 % | TEMPERATURE: 97 F | HEART RATE: 76 BPM | WEIGHT: 141 LBS | HEIGHT: 60 IN | SYSTOLIC BLOOD PRESSURE: 127 MMHG | RESPIRATION RATE: 15 BRPM

## 2019-10-24 DIAGNOSIS — Z90.13 ABSENCE OF BOTH BREASTS: ICD-10-CM

## 2019-10-24 PROCEDURE — 0HRU37Z REPLACEMENT OF LEFT BREAST WITH AUTOLOGOUS TISSUE SUBSTITUTE, PERCUTANEOUS APPROACH: ICD-10-PCS | Performed by: SURGERY

## 2019-10-24 PROCEDURE — 0JD83ZZ EXTRACTION OF ABDOMEN SUBCUTANEOUS TISSUE AND FASCIA, PERCUTANEOUS APPROACH: ICD-10-PCS | Performed by: SURGERY

## 2019-10-24 PROCEDURE — 88305 TISSUE EXAM BY PATHOLOGIST: CPT | Performed by: SURGERY

## 2019-10-24 PROCEDURE — 0HBV0ZZ EXCISION OF BILATERAL BREAST, OPEN APPROACH: ICD-10-PCS | Performed by: SURGERY

## 2019-10-24 RX ORDER — PROCHLORPERAZINE EDISYLATE 5 MG/ML
5 INJECTION INTRAMUSCULAR; INTRAVENOUS ONCE AS NEEDED
Status: DISCONTINUED | OUTPATIENT
Start: 2019-10-24 | End: 2019-10-24

## 2019-10-24 RX ORDER — HYDROCODONE BITARTRATE AND ACETAMINOPHEN 5; 325 MG/1; MG/1
1-2 TABLET ORAL EVERY 4 HOURS PRN
Qty: 40 TABLET | Refills: 0 | Status: SHIPPED | OUTPATIENT
Start: 2019-10-24

## 2019-10-24 RX ORDER — METOCLOPRAMIDE 10 MG/1
10 TABLET ORAL ONCE
Status: DISCONTINUED | OUTPATIENT
Start: 2019-10-24 | End: 2019-10-24 | Stop reason: HOSPADM

## 2019-10-24 RX ORDER — DOCUSATE SODIUM 100 MG/1
100 CAPSULE, LIQUID FILLED ORAL 2 TIMES DAILY
Qty: 40 CAPSULE | Refills: 0 | Status: SHIPPED | OUTPATIENT
Start: 2019-10-24

## 2019-10-24 RX ORDER — DIAPER,BRIEF,INFANT-TODD,DISP
EACH MISCELLANEOUS AS NEEDED
Status: DISCONTINUED | OUTPATIENT
Start: 2019-10-24 | End: 2019-10-24 | Stop reason: HOSPADM

## 2019-10-24 RX ORDER — SODIUM CHLORIDE, SODIUM LACTATE, POTASSIUM CHLORIDE, CALCIUM CHLORIDE 600; 310; 30; 20 MG/100ML; MG/100ML; MG/100ML; MG/100ML
INJECTION, SOLUTION INTRAVENOUS CONTINUOUS
Status: DISCONTINUED | OUTPATIENT
Start: 2019-10-24 | End: 2019-10-24

## 2019-10-24 RX ORDER — HYDROCODONE BITARTRATE AND ACETAMINOPHEN 5; 325 MG/1; MG/1
1 TABLET ORAL AS NEEDED
Status: COMPLETED | OUTPATIENT
Start: 2019-10-24 | End: 2019-10-24

## 2019-10-24 RX ORDER — HALOPERIDOL 5 MG/ML
0.25 INJECTION INTRAMUSCULAR ONCE AS NEEDED
Status: DISCONTINUED | OUTPATIENT
Start: 2019-10-24 | End: 2019-10-24

## 2019-10-24 RX ORDER — HYDROMORPHONE HYDROCHLORIDE 1 MG/ML
0.2 INJECTION, SOLUTION INTRAMUSCULAR; INTRAVENOUS; SUBCUTANEOUS EVERY 5 MIN PRN
Status: DISCONTINUED | OUTPATIENT
Start: 2019-10-24 | End: 2019-10-24

## 2019-10-24 RX ORDER — HYDROCODONE BITARTRATE AND ACETAMINOPHEN 5; 325 MG/1; MG/1
2 TABLET ORAL AS NEEDED
Status: COMPLETED | OUTPATIENT
Start: 2019-10-24 | End: 2019-10-24

## 2019-10-24 RX ORDER — MIDAZOLAM HYDROCHLORIDE 1 MG/ML
INJECTION INTRAMUSCULAR; INTRAVENOUS AS NEEDED
Status: DISCONTINUED | OUTPATIENT
Start: 2019-10-24 | End: 2019-10-24 | Stop reason: SURG

## 2019-10-24 RX ORDER — MORPHINE SULFATE 4 MG/ML
4 INJECTION, SOLUTION INTRAMUSCULAR; INTRAVENOUS EVERY 10 MIN PRN
Status: DISCONTINUED | OUTPATIENT
Start: 2019-10-24 | End: 2019-10-24

## 2019-10-24 RX ORDER — GLYCOPYRROLATE 0.2 MG/ML
INJECTION INTRAMUSCULAR; INTRAVENOUS AS NEEDED
Status: DISCONTINUED | OUTPATIENT
Start: 2019-10-24 | End: 2019-10-24 | Stop reason: SURG

## 2019-10-24 RX ORDER — ACETAMINOPHEN 500 MG
1000 TABLET ORAL ONCE
Status: COMPLETED | OUTPATIENT
Start: 2019-10-24 | End: 2019-10-24

## 2019-10-24 RX ORDER — ROCURONIUM BROMIDE 10 MG/ML
INJECTION, SOLUTION INTRAVENOUS AS NEEDED
Status: DISCONTINUED | OUTPATIENT
Start: 2019-10-24 | End: 2019-10-24 | Stop reason: SURG

## 2019-10-24 RX ORDER — NEOSTIGMINE METHYLSULFATE 0.5 MG/ML
INJECTION INTRAVENOUS AS NEEDED
Status: DISCONTINUED | OUTPATIENT
Start: 2019-10-24 | End: 2019-10-24 | Stop reason: SURG

## 2019-10-24 RX ORDER — ONDANSETRON 4 MG/1
4 TABLET, FILM COATED ORAL EVERY 8 HOURS PRN
Qty: 20 TABLET | Refills: 0 | Status: SHIPPED | OUTPATIENT
Start: 2019-10-24

## 2019-10-24 RX ORDER — LIDOCAINE HYDROCHLORIDE 10 MG/ML
INJECTION, SOLUTION EPIDURAL; INFILTRATION; INTRACAUDAL; PERINEURAL AS NEEDED
Status: DISCONTINUED | OUTPATIENT
Start: 2019-10-24 | End: 2019-10-24 | Stop reason: SURG

## 2019-10-24 RX ORDER — MORPHINE SULFATE 2 MG/ML
2 INJECTION, SOLUTION INTRAMUSCULAR; INTRAVENOUS EVERY 10 MIN PRN
Status: DISCONTINUED | OUTPATIENT
Start: 2019-10-24 | End: 2019-10-24

## 2019-10-24 RX ORDER — HYDROMORPHONE HYDROCHLORIDE 1 MG/ML
0.6 INJECTION, SOLUTION INTRAMUSCULAR; INTRAVENOUS; SUBCUTANEOUS EVERY 5 MIN PRN
Status: DISCONTINUED | OUTPATIENT
Start: 2019-10-24 | End: 2019-10-24

## 2019-10-24 RX ORDER — MORPHINE SULFATE 10 MG/ML
6 INJECTION, SOLUTION INTRAMUSCULAR; INTRAVENOUS EVERY 10 MIN PRN
Status: DISCONTINUED | OUTPATIENT
Start: 2019-10-24 | End: 2019-10-24

## 2019-10-24 RX ORDER — FAMOTIDINE 20 MG/1
20 TABLET ORAL ONCE
Status: DISCONTINUED | OUTPATIENT
Start: 2019-10-24 | End: 2019-10-24 | Stop reason: HOSPADM

## 2019-10-24 RX ORDER — ONDANSETRON 2 MG/ML
4 INJECTION INTRAMUSCULAR; INTRAVENOUS ONCE AS NEEDED
Status: DISCONTINUED | OUTPATIENT
Start: 2019-10-24 | End: 2019-10-24

## 2019-10-24 RX ORDER — HYDROMORPHONE HYDROCHLORIDE 1 MG/ML
0.4 INJECTION, SOLUTION INTRAMUSCULAR; INTRAVENOUS; SUBCUTANEOUS EVERY 5 MIN PRN
Status: DISCONTINUED | OUTPATIENT
Start: 2019-10-24 | End: 2019-10-24

## 2019-10-24 RX ORDER — PHENYLEPHRINE HCL 10 MG/ML
VIAL (ML) INJECTION AS NEEDED
Status: DISCONTINUED | OUTPATIENT
Start: 2019-10-24 | End: 2019-10-24 | Stop reason: SURG

## 2019-10-24 RX ORDER — DEXAMETHASONE SODIUM PHOSPHATE 4 MG/ML
VIAL (ML) INJECTION AS NEEDED
Status: DISCONTINUED | OUTPATIENT
Start: 2019-10-24 | End: 2019-10-24 | Stop reason: SURG

## 2019-10-24 RX ORDER — ONDANSETRON 2 MG/ML
INJECTION INTRAMUSCULAR; INTRAVENOUS AS NEEDED
Status: DISCONTINUED | OUTPATIENT
Start: 2019-10-24 | End: 2019-10-24 | Stop reason: SURG

## 2019-10-24 RX ORDER — CEFAZOLIN SODIUM/WATER 2 G/20 ML
2 SYRINGE (ML) INTRAVENOUS ONCE
Status: COMPLETED | OUTPATIENT
Start: 2019-10-24 | End: 2019-10-24

## 2019-10-24 RX ORDER — NALOXONE HYDROCHLORIDE 0.4 MG/ML
80 INJECTION, SOLUTION INTRAMUSCULAR; INTRAVENOUS; SUBCUTANEOUS AS NEEDED
Status: DISCONTINUED | OUTPATIENT
Start: 2019-10-24 | End: 2019-10-24

## 2019-10-24 RX ADMIN — LIDOCAINE HYDROCHLORIDE 20 MG: 10 INJECTION, SOLUTION EPIDURAL; INFILTRATION; INTRACAUDAL; PERINEURAL at 12:09:00

## 2019-10-24 RX ADMIN — ROCURONIUM BROMIDE 30 MG: 10 INJECTION, SOLUTION INTRAVENOUS at 12:09:00

## 2019-10-24 RX ADMIN — PHENYLEPHRINE HCL 100 MCG: 10 MG/ML VIAL (ML) INJECTION at 12:38:00

## 2019-10-24 RX ADMIN — LIDOCAINE HYDROCHLORIDE 30 MG: 10 INJECTION, SOLUTION EPIDURAL; INFILTRATION; INTRACAUDAL; PERINEURAL at 12:08:00

## 2019-10-24 RX ADMIN — MIDAZOLAM HYDROCHLORIDE 2 MG: 1 INJECTION INTRAMUSCULAR; INTRAVENOUS at 12:04:00

## 2019-10-24 RX ADMIN — GLYCOPYRROLATE 0.4 MG: 0.2 INJECTION INTRAMUSCULAR; INTRAVENOUS at 15:02:00

## 2019-10-24 RX ADMIN — SODIUM CHLORIDE, SODIUM LACTATE, POTASSIUM CHLORIDE, CALCIUM CHLORIDE: 600; 310; 30; 20 INJECTION, SOLUTION INTRAVENOUS at 14:18:00

## 2019-10-24 RX ADMIN — CEFAZOLIN SODIUM/WATER 2 G: 2 G/20 ML SYRINGE (ML) INTRAVENOUS at 12:15:00

## 2019-10-24 RX ADMIN — PHENYLEPHRINE HCL 100 MCG: 10 MG/ML VIAL (ML) INJECTION at 13:00:00

## 2019-10-24 RX ADMIN — DEXAMETHASONE SODIUM PHOSPHATE 4 MG: 4 MG/ML VIAL (ML) INJECTION at 13:14:00

## 2019-10-24 RX ADMIN — NEOSTIGMINE METHYLSULFATE 2 MG: 0.5 INJECTION INTRAVENOUS at 15:02:00

## 2019-10-24 RX ADMIN — ONDANSETRON 4 MG: 2 INJECTION INTRAMUSCULAR; INTRAVENOUS at 13:14:00

## 2019-10-24 NOTE — ANESTHESIA PROCEDURE NOTES
Airway  Date/Time: 10/24/2019 12:10 PM  Urgency: elective    Airway not difficult    General Information and Staff    Patient location during procedure: OR  Anesthesiologist: Marco Mac, DO  Resident/CRNA: Radha Silverio, CRNA  Performed: CRNA     Indications

## 2019-10-24 NOTE — ANESTHESIA POSTPROCEDURE EVALUATION
Patient: Keith Crowe    Procedure Summary     Date:  10/24/19 Room / Location:  [facility] OR 02 / [facility] OR    Anesthesia Start:  8900 Anesthesia Stop:  2507    Procedures:       BREAST RECONSTRUCTION SECOND STAGE/ REVISION (Bilateral )      FAT GRAFTING

## 2019-10-24 NOTE — ANESTHESIA PREPROCEDURE EVALUATION
Anesthesia PreOp Note    HPI:     Keith Crowe is a 44year old female who presents for preoperative consultation requested by: Eda Carlson MD    Date of Surgery: 10/24/2019    Procedure(s):  BREAST RECONSTRUCTION SECOND STAGE/ REVISION  FAT GRAFTING  I (six) hours as needed for Pain., Disp: , Rfl: , Taking      lactated ringers infusion, , Intravenous, Continuous, Cheryl Conn MD  acetaminophen (TYLENOL EXTRA STRENGTH) tab 1,000 mg, 1,000 mg, Oral, Once, Phill IYER MD  famoTIDine (PEPCID) tab 20 sexual activity: Not on file    Other Topics      Concerns:        Caffeine Concern: Not Asked        Exercise: Not Asked        Seat Belt: Not Asked        Special Diet: Not Asked        Stress Concern: Not Asked        Weight Concern: Not Asked    Social AM

## 2019-10-24 NOTE — BRIEF OP NOTE
Pre-Operative Diagnosis: Absence of both breasts [Z90.13]     Post-Operative Diagnosis: Absence of both breasts [Z90.13]      Procedure Performed:   Procedure(s):  Revision and reduction of both reconstructed breasts, autologous fat grafting from abdomen a

## 2019-10-25 NOTE — OPERATIVE REPORT
[facility]    PATIENT'S NAME: SERGIO GRANADOS   ATTENDING PHYSICIAN: Cheryl Chen MD   OPERATING PHYSICIAN: Cheryl Chen MD   PATIENT ACCOUNT#:   [id number]    LOCATION:  [address]  MEDICAL RECORD #:   [id number]       DATE OF BIRTH: her entire breasts, chest, abdomen, and flanks were prepped and draped in the usual sterile fashion.   The procedure was started with infiltration of tumescent solution; 1 L of LR with 1 ampule of 1:100,000 epinephrine and 50 mL of plain 1% lidocaine were i 1 mL and 3 mL syringes and infiltrating in a layered fashion in the indentations, particularly on the lateral superior aspect. A total of 150 mL was injected. Then the patient was placed in a seated position, and good symmetry had been achieved.   Then th

## 2019-10-30 ENCOUNTER — NURSE ONLY (OUTPATIENT)
Dept: SURGERY | Facility: CLINIC | Age: 49
End: 2019-10-30
Payer: COMMERCIAL

## 2019-10-30 NOTE — PROGRESS NOTES
The patient presents today for right breast drain evaluation and wound check-   She reports minimal \"soreness\" for which she does not take any pain medication. She is very pleased with the results of recent surgery.      Per patient's written record, ther

## 2019-11-08 ENCOUNTER — OFFICE VISIT (OUTPATIENT)
Dept: SURGERY | Facility: CLINIC | Age: 49
End: 2019-11-08
Payer: COMMERCIAL

## 2019-11-08 VITALS — TEMPERATURE: 96 F

## 2019-11-08 DIAGNOSIS — Z90.13 ABSENCE OF BOTH BREASTS: Primary | ICD-10-CM

## 2019-11-08 PROCEDURE — 99024 POSTOP FOLLOW-UP VISIT: CPT | Performed by: SURGERY

## 2019-11-08 NOTE — PROGRESS NOTES
Karyle Marker is a 44year old female who presents today for a follow-up after BL breast revision/ reduction fat grafting and excess skin excision flanks    She denies fever and chills. She denies nausea, vomiting, diarrhea or constipation.    Her pain is con

## 2019-11-08 NOTE — PROGRESS NOTES
Pt here for post op today. No complaints. Reduction and revision of right reconstructed breast; 2. Reduction and revision of left reconstructed breast.  3.Excision of excess skin and subcutaneous fat, bilateral flanks.   4Autologous fat grafting from pe

## 2023-02-23 NOTE — TELEPHONE ENCOUNTER
I have left patient several message regarding receiving vitamin b12 and vitamin D from his primary care doctor. If patient calls back, he needs to obtain these medications from his primary care physician  Rita Lopes CMA on 2/23/2023 at 10:19 AM     The patient called to discuss post-op dressing changes and wound care-   All incision care and showering instructions reviewed per discharge instructions.    She relates that her prescription for Norco was signed by a PA and her pharmacy will not accept [facility]

## 2023-10-10 NOTE — H&P
New Patient  Venia File  4/24/1970 2-    This patient was referred by Alexandrea Perez for evaluation and consultation for incisional-ventral hernia. Chief Complaint: Incisional-ventral hernia    History of Present Illness:  The patient is a 50- Other Topics Concern   None on file     Social History Narrative   None on file         Current Outpatient Prescriptions:   •  Tamoxifen Citrate 10 MG Oral Tab, Take 20 mg by mouth daily. , Disp: , Rfl:     No Known Allergies    History reviewed.  No p 49-year-old female who is scheduled for a bilateral prophylactic mastectomy to be performed by Dr. Allegra Grubbs. She will subsequently undergo a bilateral D IEP free flap reconstruction by Dr. Augustus Caraballo. The patient did have a CIARA/BSO in 2016.   On the preoperative Ambulatory

## (undated) DEVICE — 3M™ STERI-STRIP™ REINFORCED ADHESIVE SKIN CLOSURES, R1547, 1/2 IN X 4 IN (12 MM X 100 MM), 6 STRIPS/ENVELOPE: Brand: 3M™ STERI-STRIP™

## (undated) DEVICE — SOL  .9 1000ML BTL

## (undated) DEVICE — MAJOR GENERAL: Brand: MEDLINE INDUSTRIES, INC.

## (undated) DEVICE — SYRINGE 10ML LL CONTRL SYRINGE

## (undated) DEVICE — 3M(TM) MICROPORE TAPE DISPENSER 1535-2: Brand: 3M™ MICROPORE™

## (undated) DEVICE — PLASTIC BREAST CDS-LF: Brand: MEDLINE INDUSTRIES, INC.

## (undated) DEVICE — DRAIN RELIAVAC 100CC

## (undated) DEVICE — SUTURE VICRYL 2-0 CT-1

## (undated) DEVICE — 40580 - THE PINK PAD - ADVANCED TRENDELENBURG POSITIONING KIT: Brand: 40580 - THE PINK PAD - ADVANCED TRENDELENBURG POSITIONING KIT

## (undated) DEVICE — INTENDED FOR TISSUE SEPARATION, AND OTHER PROCEDURES THAT REQUIRE A SHARP SURGICAL BLADE TO PUNCTURE OR CUT.: Brand: BARD-PARKER ® STAINLESS STEEL BLADES

## (undated) DEVICE — BANDAGE ROLL,100% COTTON, 6 PLY, LARGE: Brand: KERLIX

## (undated) DEVICE — GLOVE SURG SENSICARE SZ 6-1/2

## (undated) DEVICE — GAMMEX® PI HYBRID SIZE 6.5, STERILE POWDER-FREE SURGICAL GLOVE, POLYISOPRENE AND NEOPRENE BLEND: Brand: GAMMEX

## (undated) DEVICE — PROXIMATE SKIN STAPLERS (35 WIDE) CONTAINS 35 STAINLESS STEEL STAPLES (FIXED HEAD): Brand: PROXIMATE

## (undated) DEVICE — BLADE ELECTROSURG 4IN INSULATE

## (undated) DEVICE — SUTURE PLAIN GUT 5-0 PC-1

## (undated) DEVICE — 3M(TM) TEGADERM(TM) TRANSPARENT FILM DRESSING FRAME STYLE 9505W: Brand: 3M™ TEGADERM™

## (undated) DEVICE — BIPOLAR FORCEPS CORD,BANANA LEADS: Brand: VALLEYLAB

## (undated) DEVICE — MICRO CLIP GOLD

## (undated) DEVICE — DRAIN RESERVOIR RELIAVAC 100CC

## (undated) DEVICE — SUTURE PROLENE 4-0 RB-1

## (undated) DEVICE — SUTURE VICRYL 0 CT-1

## (undated) DEVICE — UNDYED BRAIDED (POLYGLACTIN 910), SYNTHETIC ABSORBABLE SUTURE: Brand: COATED VICRYL

## (undated) DEVICE — SPONGE RAYTEC 4X4 RF DETECT

## (undated) DEVICE — ABDOMINAL BINDER: Brand: DEROYAL

## (undated) DEVICE — SUPER SPONGES,MEDIUM: Brand: KERLIX

## (undated) DEVICE — GAMMEX® PI HYBRID SIZE 7, STERILE POWDER-FREE SURGICAL GLOVE, POLYISOPRENE AND NEOPRENE BLEND: Brand: GAMMEX

## (undated) DEVICE — Device

## (undated) DEVICE — 6 ML SYRINGE LUER-LOCK TIP: Brand: MONOJECT

## (undated) DEVICE — GOWN,PREVENTION PLUS,LARGE,STERILE: Brand: MEDLINE

## (undated) DEVICE — VIOPTIX DISPOSABLE SENSOR

## (undated) DEVICE — DRAPE PACK CHEST & U BAR

## (undated) DEVICE — SUTURE PROLENE 1 CT-1

## (undated) DEVICE — GLOVE SURG SENSICARE SZ 7

## (undated) DEVICE — SUTURE VICRYL 4-0 RB-1

## (undated) DEVICE — DRESSING BIOPATCH 1X4 CNTR

## (undated) DEVICE — VEST SRG 3 MED CUP R/L

## (undated) DEVICE — SUTURE VICRYL 3-0 SH

## (undated) DEVICE — SUTURE ETHILON 3-0 669H

## (undated) DEVICE — PSI-TEC TUBING: Brand: PSI-TEC TUBING

## (undated) DEVICE — 3M™ TEGADERM™ TRANSPARENT FILM DRESSING, 1626W, 4 IN X 4-3/4 IN (10 CM X 12 CM), 50 EACH/CARTON, 4 CARTON/CASE: Brand: 3M™ TEGADERM™

## (undated) DEVICE — DERMABOND LIQUID ADHESIVE

## (undated) DEVICE — DRAIN BLAKE ROUND 15FR

## (undated) DEVICE — LAPAROTOMY SPONGE - RF AND X-RAY DETECTABLE PRE-WASHED: Brand: SITUATE

## (undated) DEVICE — GLOVE BIOGEL M SURG SZ 6-1/2

## (undated) DEVICE — ABDOMINAL PAD: Brand: DERMACEA

## (undated) DEVICE — SYRINGE 50ML LL TIP

## (undated) DEVICE — SUTURE MONOCRYL 4-0 PS-2

## (undated) DEVICE — DEVICE FAT TISSUE COLL REVOLVE

## (undated) DEVICE — 60 ML SYRINGE LUER-LOCK TIP: Brand: MONOJECT

## (undated) DEVICE — MEDI-VAC NON-CONDUCTIVE SUCTION TUBING: Brand: CARDINAL HEALTH

## (undated) DEVICE — DECANTER BAG 9": Brand: MEDLINE INDUSTRIES, INC.

## (undated) DEVICE — CHLORAPREP 26ML APPLICATOR

## (undated) DEVICE — DRESSING FOAM TOPIFOAM

## (undated) DEVICE — SUCTION CANISTER, 3000CC,SAFELINER: Brand: DEROYAL

## (undated) DEVICE — MARKER SKIN PREP RESIST STRL

## (undated) DEVICE — MEDI-VAC YANKAUER SUCTION HANDLE W/BULBOUS TIP: Brand: CARDINAL HEALTH

## (undated) DEVICE — LAPAROTOMY CDS: Brand: MEDLINE INDUSTRIES, INC.

## (undated) DEVICE — STANDARD HYPODERMIC NEEDLE,POLYPROPYLENE HUB: Brand: MONOJECT

## (undated) DEVICE — DRAPE HALF 40X58 DYNJP2410

## (undated) DEVICE — #10 STERILE BLADE: Brand: POLYMER COATED BLADES

## (undated) DEVICE — PLASTC TOOMEY SYRNG DISP

## (undated) DEVICE — RETRACTOR LONE STAR STAYS BLNT

## (undated) DEVICE — LIGACLIP MCA MULTIPLE CLIP APPLIERS, 30 MEDIUM CLIPS: Brand: LIGACLIP

## (undated) DEVICE — FLEXIBLE YANKAUER,MEDIUM TIP, NO VACUUM CONTROL: Brand: ARGYLE

## (undated) DEVICE — MEDI-VAC SUCTION HANDLE REGULAR CAPACITY: Brand: CARDINAL HEALTH

## (undated) DEVICE — GOWN,SIRUS,FABRIC-REINFORCED,LARGE: Brand: MEDLINE

## (undated) DEVICE — HEMOCLIP HORIZON SM 001200

## (undated) DEVICE — ADHESIVE MASTISOL 2/3CC VL

## (undated) DEVICE — LAMINECTOMY ARM CRADLE FOAM POSITIONER: Brand: CARDINAL HEALTH

## (undated) DEVICE — CANISTER SHELL LIPOSUCTION

## (undated) DEVICE — PROXIMATE RH ROTATING HEAD SKIN STAPLERS (35 WIDE) CONTAINS 35 STAINLESS STEEL STAPLES: Brand: PROXIMATE

## (undated) DEVICE — BLAKE SILICONE DRAIN, 15 FR ROUND, HUBLESS WITH 3/16" TROCAR: Brand: BLAKE

## (undated) DEVICE — #15 STERILE STAINLESS BLADE: Brand: STERILE STAINLESS BLADES

## (undated) DEVICE — 3M™ IOBAN™ 2 ANTIMICROBIAL INCISE DRAPE 6650EZ: Brand: IOBAN™ 2

## (undated) DEVICE — 3M™ IOBAN™ 2 ANTIMICROBIAL INCISE DRAPE 6648EZ: Brand: IOBAN™ 2

## (undated) DEVICE — SUTURE ETHIBOND 0 CT-1

## (undated) DEVICE — BREAST-HERNIA-PORT CDS-LF: Brand: MEDLINE INDUSTRIES, INC.

## (undated) DEVICE — DRAPE EQUIPMENT INTRATEMP THER

## (undated) DEVICE — SUTURE PROLENE 4-0 PS-2

## (undated) DEVICE — 1010 S-DRAPE TOWEL DRAPE 10/BX: Brand: STERI-DRAPE™

## (undated) DEVICE — SUTURE SILK 2-0 FS

## (undated) DEVICE — REM POLYHESIVE ADULT PATIENT RETURN ELECTRODE: Brand: VALLEYLAB

## (undated) DEVICE — 12 ML SYRINGE LUER-LOCK TIP: Brand: MONOJECT

## (undated) DEVICE — PROXIMATE PLUS MD MULTI-DIRECTIONAL RELEASE SKIN STAPLERS CONTAINS 35 STAINLESS STEEL STAPLES APPROXIMATE CLOSED DIMENSIONS: 6.9MM X 3.9MM WIDE: Brand: PROXIMATE

## (undated) DEVICE — DRAPE MICROSCOPE LEICA

## (undated) DEVICE — SUTURE VICRYL 5-0 P-3

## (undated) DEVICE — SPECIMEN CONTAINER,POSITIVE SEAL INDICATOR, OR PACKAGED: Brand: PRECISION

## (undated) DEVICE — SOL LACT RINGERS 3000ML

## (undated) DEVICE — SUTURE VICRYL 0 J340H

## (undated) DEVICE — TOWEL OR BLU 16X26 STRL

## (undated) DEVICE — SOL LACT RINGERS 1000ML

## (undated) DEVICE — 3 ML SYRINGE LUER-LOCK TIP: Brand: MONOJECT

## (undated) DEVICE — 3M™ STERI-DRAPE™ INSTRUMENT POUCH 1018: Brand: STERI-DRAPE™

## (undated) DEVICE — FREE FLAP: Brand: MEDLINE INDUSTRIES, INC.

## (undated) DEVICE — VIOLET BRAIDED (POLYGLACTIN 910), SYNTHETIC ABSORBABLE SUTURE: Brand: COATED VICRYL

## (undated) DEVICE — 3M™ STERI-STRIP™ REINFORCED ADHESIVE SKIN CLOSURES, R1548, 1 IN X 5 IN (25 MM X 125 MM), 4 STRIPS/ENVELOPE: Brand: 3M™ STERI-STRIP™

## (undated) DEVICE — GLOVE SURG SENSICARE SZ 7-1/2

## (undated) DEVICE — CAUTERY BLADE 2IN INS E1455

## (undated) DEVICE — INSULATED BLADE ELECTRODE 6.5

## (undated) DEVICE — DRAPE,TAPE STRIPS,STERILE: Brand: MEDLINE

## (undated) DEVICE — SUTURE VICRYL 2-0

## (undated) DEVICE — CG INFILTRATION TUBING: Brand: CG INFILTRATION TUBING

## (undated) DEVICE — SUTURE ETHILON 2-0 FS

## (undated) DEVICE — MARKER SKIN 2 TIP

## (undated) DEVICE — SUTURE ETHIBOND EXCEL 0 MO-7

## (undated) DEVICE — SUTURE PROLENE 8-0 BV-130-5

## (undated) DEVICE — TOWEL: OR BLU 80/CS: Brand: MEDICAL ACTION INDUSTRIES

## (undated) DEVICE — LIGACLIP MCA MULTIPLE CLIP APPLIERS, 20 SMALL CLIPS: Brand: LIGACLIP

## (undated) DEVICE — KENDALL SCD EXPRESS SLEEVES, KNEE LENGTH, MEDIUM: Brand: KENDALL SCD

## (undated) DEVICE — SYRINGE 10ML LL TIP

## (undated) DEVICE — SUTURE SILK 0

## (undated) DEVICE — HEMOCLIP HORIZON MED 002200

## (undated) DEVICE — BRA SURGICAL ELIZABETH PINK L

## (undated) DEVICE — GLOVE SURG TRIUMPH SZ 6-1/2

## (undated) DEVICE — TUBING LIPOSUCTION FLEX B

## (undated) DEVICE — BLANKET HYPOTHERMIA ADULT

## (undated) DEVICE — 2.5MM RED COUPLER
Type: IMPLANTABLE DEVICE | Site: BREAST | Status: NON-FUNCTIONAL
Removed: 2018-03-05

## (undated) DEVICE — 1/2 ML INSULIN SAFETY SYRINGE,PERMANENT NEEDLE: Brand: MAGELLAN

## (undated) DEVICE — USE ITEM #176901

## (undated) NOTE — LETTER
[facility]  [address] [facility], [address] [place] [address]    Consent for Operation    Date: __________________    Time: _______________    1.  I authorize the performance upon Sujit Lee the following operation:    Procedure(s):  Bilateral skin sparing m medical, scientific, or educational purposes, provided my identity is not revealed by the pictures or by descriptive texts accompanying them. If the procedure has been videotaped, the surgeon will obtain the original videotape.  The hospital will not be res I CERTIFY THAT I HAVE READ AND UNDERSTAND THE ABOVE CONSENT TO OPERATION, THAT MY DOCTOR PROVIDED ME WITH THE ABOVE EXPLANATIONS, THAT ALL BLANKS OR STATEMENTS REQUIRING INSERTION OR COMPLETION WERE FILLED IN.     Signature of Patient:   ___________________ · All of the medicines I take (including prescriptions, herbal supplements, and pills I can buy without a prescription (including street drugs/illegal medications).  Failure to inform my anesthesiologist about these medicines may increase my risk of anesthe _____________________________________________________________________________  Anesthesiologist Signature     Date   Time  I have discussed the procedure and information above with the patient (or patient’s representative) and answered their questions.  The

## (undated) NOTE — LETTER
03/16/18    Dear Nisreen Arreolaper,     Assessment   Incisional hernia of anterior abdominal wall without obstruction or gangrene  (primary encounter diagnosis)  S/P bilateral mastectomy  S/P breast reconstruction, bilateral  S/P repair of ventral hernia

## (undated) NOTE — LETTER
[address] [facility] [address]  [facility]: [phone]  FAX: [address] [facility]    Malka Garcia MD  [address] [place] [address] Avenue: [phone]    Anil terry

## (undated) NOTE — LETTER
[facility]  [address], [address] [address]  [facility]: [phone]  FAX: 295 [facility]    Twila Gomez, [address]: [phone]    The joel

## (undated) NOTE — LETTER
[facility]  [facility] [address]  [facility]: [phone]  FAX: Alejandro Harvey MD  [address] [facility]  VIA Facsimile: [phone]